# Patient Record
Sex: FEMALE | Race: BLACK OR AFRICAN AMERICAN | NOT HISPANIC OR LATINO | Employment: OTHER | URBAN - METROPOLITAN AREA
[De-identification: names, ages, dates, MRNs, and addresses within clinical notes are randomized per-mention and may not be internally consistent; named-entity substitution may affect disease eponyms.]

---

## 2017-03-01 ENCOUNTER — HOSPITAL ENCOUNTER (EMERGENCY)
Facility: HOSPITAL | Age: 82
Discharge: HOME | End: 2017-03-01
Attending: EMERGENCY MEDICINE
Payer: MEDICARE

## 2017-03-01 VITALS
WEIGHT: 106 LBS | TEMPERATURE: 97 F | HEART RATE: 63 BPM | BODY MASS INDEX: 19.51 KG/M2 | OXYGEN SATURATION: 99 % | SYSTOLIC BLOOD PRESSURE: 129 MMHG | RESPIRATION RATE: 16 BRPM | HEIGHT: 62 IN | DIASTOLIC BLOOD PRESSURE: 62 MMHG

## 2017-03-01 DIAGNOSIS — R60.9 DEPENDENT EDEMA: Primary | ICD-10-CM

## 2017-03-01 PROCEDURE — 99282 EMERGENCY DEPT VISIT SF MDM: CPT

## 2017-03-01 RX ORDER — GLIPIZIDE 5 MG/1
5 TABLET ORAL
COMMUNITY
End: 2023-05-24 | Stop reason: HOSPADM

## 2017-03-01 RX ORDER — METFORMIN HYDROCHLORIDE 500 MG/1
500 TABLET ORAL 2 TIMES DAILY WITH MEALS
COMMUNITY
End: 2023-05-24 | Stop reason: HOSPADM

## 2017-03-01 NOTE — ED AVS SNAPSHOT
[facility]  [address] [place] [address]               Tatiana Sparrow   3/1/2017  7:23 PM   ED    Description:  Female : 1920   Department:  [facility]           Your Care was Coordinated By:     Provider Role From To    Jessica Blandon MD Attending Provider 17 --      Reason for Visit     Foot Swelling           Diagnoses this Visit        Comments    Dependent edema    -  Primary       ED Disposition     ED Disposition Condition Comment    Discharge             To Do List           Follow-up Information     Follow up with Apple Beavers MD In 2 days.    Specialty:  Internal Medicine    Why:  For further care    Contact information:    [address]  [address]  [facility] [place] [address]  [phone]        [facility]     [facility] -  Assistance  Registered nurses in the [facility] provide clinical advisement, health education, appointment booking, and other advisory services.  Call for this free service at [phone].             Medications           Message regarding Medications     Verify the changes and/or additions to your medication regime listed below are the same as discussed with your clinician today.  If any of these changes or additions are incorrect, please notify your healthcare provider.             Verify that the below list of medications is an accurate representation of the medications you are currently taking.  If none reported, the list may be blank. If incorrect, please contact your healthcare provider. Carry this list with you in case of emergency.           Current Medications     glipiZIDE (GLUCOTROL) 5 MG tablet Take 5 mg by mouth 2 (two) times daily before meals.    LEVOTHYROXINE SODIUM (LEVOTHYROXINE ORAL) Take by mouth.    metformin (GLUCOPHAGE) 500 MG tablet Take 500 mg by mouth 2 (two) times daily with meals.    NIFEDIPINE (NIFEDICAL XL  ORAL) Take by mouth.           Clinical Reference Information           Your Vitals Were     BP                   130/66 (BP Location: Right arm, Patient Position: Sitting)           Allergies as of 3/1/2017     No Known Allergies      Immunizations Administered on Date of Encounter - 3/1/2017     None      ED Micro, Lab, POCT     None      ED Imaging Orders     None        Discharge Instructions         Leg Swelling in Both Legs    Swelling of the feet, ankles, and legs is called edema. It is caused by excess fluid that has collected in the tissues. Extra fluid in the body settles in the lowest part because of gravity. This is why the legs and feet are most affected.  Some of the causes for edema include:  · Disease of the heart like congestive heart failure  · Standing or sitting for long periods of time  · Infection of the feet or legs  · Blood pooling in the veins of your legs (venous insufficiency)  · Dilated veins in your lower leg (varicose veins)  · Garters or other clothing that is tight on your legs. This will cause blood to pool in your legs because the clothing limits blood flow.  · Some medicines such as hormones like birth control pills, some blood pressure medicines like calcium channel blockers (amlodipine) and steroids, some antidepressants like MAO inhibitors and tricyclics  · Menstrual periods that cause you to retain fluids  · Many types of renal disease  · Liver failure or cirrhosis  · Pregnancy, some swelling is normal, but a sudden increase in leg swelling or weight gain can be a sign of a dangerous complication of pregnancy  · Poor nutrition  · Thyroid disease  Medical treatment will depend on what is causing the swelling in your legs. Your healthcare provider may prescribe water pills (diuretics) to get rid of the extra fluid.  Home care  Follow these guidelines when caring for yourself at home:  · Don't wear clothing like garters that is tight on your legs.  · Keep your legs up while lying  or sitting.  · If infection, injury, or recent surgery is causing the swelling, stay off your legs as much as possible until symptoms get better.  · If your healthcare provider says that your leg swelling is caused by venous insufficiency or varicose veins, don't sit or  one place for long periods of time. Take breaks and walk about every few hours. Brisk walking is a good exercise. It helps circulate the blood that has collected in your leg. Talk with your provider about using support stockings to stop daytime leg swelling.  · If your provider says that heart disease is causing your leg swelling, follow a low-salt diet to stop extra fluid from staying in your body. You may also need medicine.  Follow-up care  Follow up with your healthcare provider, or as advised.  When to seek medical advice  Call your healthcare provider right away if any of these occur:  · New shortness of breath or chest pain  · Shortness of breath or chest pain that gets worse  · Swelling in both legs or ankles that gets worse  · Swelling of the abdomen  · Redness, warmth, or swelling in one leg  · Fever of 100.4ºF (38ºC) or higher, or as directed by your healthcare provider  · Yellow color to your skin or eyes  · Rapid, unexplained weight gain  · Having to sleep upright or use an increased number of pillows  Date Last Reviewed: 3/31/2016  © 3725-4545 [employer]. [address] [place] [address], [place] [address]. All rights reserved. This information is not intended as a substitute for professional medical care. Always follow your healthcare professional's instructions.          Lymphedema  The lymphatic system is made up of lymph vessels and lymph nodes, which carry a fluid called lymph. Lymph consists of waste from the cells. This fluid drains through lymph vessels under the skin to nearby lymph nodes. Lymph nodes filter waste products from the cells and kill any bacteria present before returning the lymph fluid to your blood  circulation.  When the lymph vessels are damaged, lymph fluid cannot drain from tissues. This causes the lymph fluid to back up leading to swelling. This most often affects the arms or legs. Signs of lymphedema include heaviness, stiffness, or aching in an arm or leg. The limb may swell. The skin might look red. Shoes and rings may feel tight. Ankles and wrists might become less flexible.  The most common cause of damage to the lymph system is surgery or radiation for breast or testicular cancer. Other causes include repeated skin infections (cellulitis), burns, or injury to the arms or legs. It can take many years for symptoms of lymphedema to appear. Once present, lymphedema can become a chronic condition. This means the problem can be managed but not cured.   Treatment often includes use of compression garments, massage, and special exercises. Talk to your healthcare provider about these therapies and best treatment plan for you.  Home care  You can help keep the condition from getting worse. Follow all instructions you have been given. Do your exercises and wear your compression garments as recommended. Also, care for yourself as instructed.   · Small skin injuries like a cut, burn, or insect bite are more likely to cause a skin infection. Take special care to avoid injury. If you have any signs of infection, call your healthcare provider right away.  · Take care of your skin and nails. Moisturize dry skin. Wear protective gloves when doing chores such as gardening.   · Don't wear tight clothing or jewelry on the affected arm or leg. Avoid carrying bags or other weight on the affected arm.  · Save with an electric razor instead of a razor blade.  · If at all possible, dont have blood pressure taken, get injections, or have blood drawn in the affected arm.  · If a leg is involved, dont cross your legs when sitting. Don't go barefoot.  · Avoid hot tubs, steam rooms, and saunas.  If you are at risk for lymphedema  but have not developed it, these tips can help also help prevent it. Follow your healthcare provider's instructions.  Follow-up care  Follow up with your healthcare provider, or as advised.  Lymphedema can change the appearance of your body. This can be emotionally difficult to adjust to. You may benefit from a support group where practical advice and emotional support is offered. Individual counseling is another option.  When to seek medical advice  Call your healthcare provider right away if any of these occur:  · Swelling worsens  · Rash, blistering, or other skin changes on the affected limb  · Area of skin becomes red, painful, or warm to the touch  · A wound increases in pain, becomes warm, drains pus, or radiates red streaks  · Fever of 100.4°F (38°C) or higher, or as directed by your healthcare provider  Date Last Reviewed: 10/1/2016  © 6717-9889 [employer]. [address] [place] [address]. All rights reserved. This information is not intended as a substitute for professional medical care. Always follow your healthcare professional's instructions.          [facility] Sign-Up     Activating your [facility] account is as easy as 1-2-3!     1) Visit [email or web address], select Sign Up Now, enter this activation code and your date of birth, then select Next.  [email or web address]  Expires: 4/15/2017  8:29 PM      2) Create a username and password to use when you visit [facility] in the future and select a security question in case you lose your password and select Next.    3) Enter your e-mail address and click Sign Up!    Additional Information  If you have questions, please e-mail [email or web address] or call [phone] to talk to our [facility] staff. Remember, [facility] is NOT to be used for urgent needs. For medical emergencies, dial 911.         Smoking Cessation     If you would like to quit smoking:   You may be eligible for free services if you are a [place] resident and started  smoking cigarettes before September 1, 1988.  Call the Smoking Cessation Trust (SCT) toll free at (279) 433-6757 or (251) 213-3129.   Call 1-800-QUIT-NOW if you do not meet the above criteria.             [facility] complies with applicable Federal civil rights laws and does not discriminate on the basis of race, color, national origin, age, disability, or sex.        Language Assistance Services     ATTENTION: Language assistance services are available, free of charge. Please call [phone].      ATENCIÓN: Si habla español, tiene a leger disposición servicios gratuitos de asistencia lingüística. Llame al [phone].     CHÚ Ý: N?u b?n nói Ti?ng Vi?t, có các d?ch v? h? tr? ngôn ng? mi?n phí dành cho b?n. G?i s? [phone].

## 2017-03-02 NOTE — ED PROVIDER NOTES
Encounter Date: 3/1/2017       History     Chief Complaint   Patient presents with    Foot Swelling     Review of patient's allergies indicates:  No Known Allergies  HPI Comments: Patient is having swelling to both lower extremities.  States this been going on for several days now.  Patient denies chest pain or shortness of breath    Patient is a 89 y.o. female presenting with the following complaint: general illness. The history is provided by the patient and a caregiver.   General Illness    The current episode started several days ago. The problem occurs continuously. The problem has been unchanged. The pain is at a severity of 2/10. Nothing relieves the symptoms. Nothing aggravates the symptoms. Pertinent negatives include no fever, no double vision, no constipation, no nausea, no vomiting, no congestion, no rhinorrhea, no muscle aches, no cough and no shortness of breath.     Past Medical History:   Diagnosis Date    Diabetes mellitus     Hypertension     Thyroid disease      Past Surgical History:   Procedure Laterality Date    CHOLECYSTECTOMY       No family history on file.  Social History   Substance Use Topics    Smoking status: None    Smokeless tobacco: None    Alcohol use None     Review of Systems   Constitutional: Negative for fever.   HENT: Negative for congestion and rhinorrhea.    Eyes: Negative for double vision.   Respiratory: Negative for cough and shortness of breath.    Cardiovascular: Positive for leg swelling.   Gastrointestinal: Negative for constipation, nausea and vomiting.   All other systems reviewed and are negative.      Physical Exam   Initial Vitals   BP Pulse Resp Temp SpO2   03/01/17 1845 03/01/17 1845 03/01/17 1845 03/01/17 1845 03/01/17 1845   130/66 60 16 97.1 °F (36.2 °C) 99 %     Physical Exam    Nursing note and vitals reviewed.  Constitutional: She appears well-developed and well-nourished.   HENT:   Head: Normocephalic and atraumatic.   Eyes: EOM are normal.    Neck: Normal range of motion. Neck supple.   Cardiovascular: Normal rate, regular rhythm, normal heart sounds and intact distal pulses.   Pulmonary/Chest: Breath sounds normal.   Abdominal: Soft.   Musculoskeletal: Normal range of motion.   Patient has mild swelling to the pretibial area up to mid calf.   Neurological: She is alert and oriented to person, place, and time.   Skin: Skin is warm and dry.   Psychiatric: She has a normal mood and affect. Her behavior is normal. Judgment and thought content normal.         ED Course   Procedures  Labs Reviewed - No data to display                            ED Course     Clinical Impression:   The encounter diagnosis was Dependent edema.    Disposition:   Disposition: Discharged  Condition: Stable       Jessica Blandon MD  03/02/17 0213

## 2017-03-02 NOTE — DISCHARGE INSTRUCTIONS
Leg Swelling in Both Legs    Swelling of the feet, ankles, and legs is called edema. It is caused by excess fluid that has collected in the tissues. Extra fluid in the body settles in the lowest part because of gravity. This is why the legs and feet are most affected.  Some of the causes for edema include:  · Disease of the heart like congestive heart failure  · Standing or sitting for long periods of time  · Infection of the feet or legs  · Blood pooling in the veins of your legs (venous insufficiency)  · Dilated veins in your lower leg (varicose veins)  · Garters or other clothing that is tight on your legs. This will cause blood to pool in your legs because the clothing limits blood flow.  · Some medicines such as hormones like birth control pills, some blood pressure medicines like calcium channel blockers (amlodipine) and steroids, some antidepressants like MAO inhibitors and tricyclics  · Menstrual periods that cause you to retain fluids  · Many types of renal disease  · Liver failure or cirrhosis  · Pregnancy, some swelling is normal, but a sudden increase in leg swelling or weight gain can be a sign of a dangerous complication of pregnancy  · Poor nutrition  · Thyroid disease  Medical treatment will depend on what is causing the swelling in your legs. Your healthcare provider may prescribe water pills (diuretics) to get rid of the extra fluid.  Home care  Follow these guidelines when caring for yourself at home:  · Don't wear clothing like garters that is tight on your legs.  · Keep your legs up while lying or sitting.  · If infection, injury, or recent surgery is causing the swelling, stay off your legs as much as possible until symptoms get better.  · If your healthcare provider says that your leg swelling is caused by venous insufficiency or varicose veins, don't sit or  one place for long periods of time. Take breaks and walk about every few hours. Brisk walking is a good exercise. It helps  circulate the blood that has collected in your leg. Talk with your provider about using support stockings to stop daytime leg swelling.  · If your provider says that heart disease is causing your leg swelling, follow a low-salt diet to stop extra fluid from staying in your body. You may also need medicine.  Follow-up care  Follow up with your healthcare provider, or as advised.  When to seek medical advice  Call your healthcare provider right away if any of these occur:  · New shortness of breath or chest pain  · Shortness of breath or chest pain that gets worse  · Swelling in both legs or ankles that gets worse  · Swelling of the abdomen  · Redness, warmth, or swelling in one leg  · Fever of 100.4ºF (38ºC) or higher, or as directed by your healthcare provider  · Yellow color to your skin or eyes  · Rapid, unexplained weight gain  · Having to sleep upright or use an increased number of pillows  Date Last Reviewed: 3/31/2016  © 6661-0077 [employer]. [address] [place] [address], [place] [address]. All rights reserved. This information is not intended as a substitute for professional medical care. Always follow your healthcare professional's instructions.          Lymphedema  The lymphatic system is made up of lymph vessels and lymph nodes, which carry a fluid called lymph. Lymph consists of waste from the cells. This fluid drains through lymph vessels under the skin to nearby lymph nodes. Lymph nodes filter waste products from the cells and kill any bacteria present before returning the lymph fluid to your blood circulation.  When the lymph vessels are damaged, lymph fluid cannot drain from tissues. This causes the lymph fluid to back up leading to swelling. This most often affects the arms or legs. Signs of lymphedema include heaviness, stiffness, or aching in an arm or leg. The limb may swell. The skin might look red. Shoes and rings may feel tight. Ankles and wrists might become less flexible.  The most  common cause of damage to the lymph system is surgery or radiation for breast or testicular cancer. Other causes include repeated skin infections (cellulitis), burns, or injury to the arms or legs. It can take many years for symptoms of lymphedema to appear. Once present, lymphedema can become a chronic condition. This means the problem can be managed but not cured.   Treatment often includes use of compression garments, massage, and special exercises. Talk to your healthcare provider about these therapies and best treatment plan for you.  Home care  You can help keep the condition from getting worse. Follow all instructions you have been given. Do your exercises and wear your compression garments as recommended. Also, care for yourself as instructed.   · Small skin injuries like a cut, burn, or insect bite are more likely to cause a skin infection. Take special care to avoid injury. If you have any signs of infection, call your healthcare provider right away.  · Take care of your skin and nails. Moisturize dry skin. Wear protective gloves when doing chores such as gardening.   · Don't wear tight clothing or jewelry on the affected arm or leg. Avoid carrying bags or other weight on the affected arm.  · Save with an electric razor instead of a razor blade.  · If at all possible, dont have blood pressure taken, get injections, or have blood drawn in the affected arm.  · If a leg is involved, dont cross your legs when sitting. Don't go barefoot.  · Avoid hot tubs, steam rooms, and saunas.  If you are at risk for lymphedema but have not developed it, these tips can help also help prevent it. Follow your healthcare provider's instructions.  Follow-up care  Follow up with your healthcare provider, or as advised.  Lymphedema can change the appearance of your body. This can be emotionally difficult to adjust to. You may benefit from a support group where practical advice and emotional support is offered. Individual  counseling is another option.  When to seek medical advice  Call your healthcare provider right away if any of these occur:  · Swelling worsens  · Rash, blistering, or other skin changes on the affected limb  · Area of skin becomes red, painful, or warm to the touch  · A wound increases in pain, becomes warm, drains pus, or radiates red streaks  · Fever of 100.4°F (38°C) or higher, or as directed by your healthcare provider  Date Last Reviewed: 10/1/2016  © 3721-6123 [employer]. [address] [place] [address]. All rights reserved. This information is not intended as a substitute for professional medical care. Always follow your healthcare professional's instructions.

## 2017-03-02 NOTE — ED NOTES
"Per the son " they family drank some water with I think sodium in it and some of us blow up so I think that's why her feet are swelling"  "

## 2023-01-01 ENCOUNTER — HOSPITAL ENCOUNTER (INPATIENT)
Facility: HOSPITAL | Age: 88
LOS: 1 days | DRG: 871 | End: 2023-05-23
Attending: EMERGENCY MEDICINE | Admitting: STUDENT IN AN ORGANIZED HEALTH CARE EDUCATION/TRAINING PROGRAM
Payer: MEDICARE

## 2023-01-01 VITALS
SYSTOLIC BLOOD PRESSURE: 150 MMHG | HEART RATE: 80 BPM | WEIGHT: 106 LBS | DIASTOLIC BLOOD PRESSURE: 77 MMHG | RESPIRATION RATE: 28 BRPM | HEIGHT: 62 IN | BODY MASS INDEX: 19.51 KG/M2 | TEMPERATURE: 98 F | OXYGEN SATURATION: 98 %

## 2023-01-01 DIAGNOSIS — R94.31 QT PROLONGATION: ICD-10-CM

## 2023-01-01 DIAGNOSIS — E87.5 HYPERKALEMIA: ICD-10-CM

## 2023-01-01 DIAGNOSIS — R07.9 CHEST PAIN: ICD-10-CM

## 2023-01-01 DIAGNOSIS — R41.82 AMS (ALTERED MENTAL STATUS): ICD-10-CM

## 2023-01-01 DIAGNOSIS — R00.1 BRADYCARDIA: ICD-10-CM

## 2023-01-01 DIAGNOSIS — R79.89 ELEVATED BRAIN NATRIURETIC PEPTIDE (BNP) LEVEL: ICD-10-CM

## 2023-01-01 LAB
ACANTHOCYTES BLD QL SMEAR: PRESENT
ACINETOBACTER CALCOACETICUS/BAUMANNII COMPLEX: NOT DETECTED
ALBUMIN SERPL BCP-MCNC: 2.4 G/DL (ref 3.5–5.2)
ALBUMIN SERPL BCP-MCNC: 2.4 G/DL (ref 3.5–5.2)
ALBUMIN SERPL BCP-MCNC: 3.2 G/DL (ref 3.5–5.2)
ALP SERPL-CCNC: 72 U/L (ref 55–135)
ALT SERPL W/O P-5'-P-CCNC: 36 U/L (ref 10–44)
ANION GAP SERPL CALC-SCNC: 10 MMOL/L (ref 8–16)
ANION GAP SERPL CALC-SCNC: 13 MMOL/L (ref 8–16)
ANION GAP SERPL CALC-SCNC: 13 MMOL/L (ref 8–16)
ANION GAP SERPL CALC-SCNC: 14 MMOL/L (ref 8–16)
ANION GAP SERPL CALC-SCNC: 21 MMOL/L (ref 8–16)
ANION GAP SERPL CALC-SCNC: 21 MMOL/L (ref 8–16)
ANISOCYTOSIS BLD QL SMEAR: SLIGHT
ANISOCYTOSIS BLD QL SMEAR: SLIGHT
APTT PPP: 24.4 SEC (ref 21–32)
ASCENDING AORTA: 2.94 CM
ASCENDING AORTA: 3.53 CM
AST SERPL-CCNC: 45 U/L (ref 10–40)
AV INDEX (PROSTH): 0.56
AV INDEX (PROSTH): 0.58
AV MEAN GRADIENT: 2 MMHG
AV MEAN GRADIENT: 9 MMHG
AV PEAK GRADIENT: 14 MMHG
AV PEAK GRADIENT: 4 MMHG
AV VALVE AREA: 1.54 CM2
AV VALVE AREA: 1.62 CM2
AV VELOCITY RATIO: 0.5
AV VELOCITY RATIO: 0.62
B-OH-BUTYR BLD STRIP-SCNC: 0.2 MMOL/L (ref 0–0.5)
BACTERIA #/AREA URNS AUTO: ABNORMAL /HPF
BACTERIA UR CULT: ABNORMAL
BACTERIA UR CULT: ABNORMAL
BACTEROIDES FRAGILIS: NOT DETECTED
BASOPHILS # BLD AUTO: 0.01 K/UL (ref 0–0.2)
BASOPHILS # BLD AUTO: 0.1 K/UL (ref 0–0.2)
BASOPHILS # BLD AUTO: ABNORMAL K/UL (ref 0–0.2)
BASOPHILS # BLD AUTO: ABNORMAL K/UL (ref 0–0.2)
BASOPHILS NFR BLD: 0 % (ref 0–1.9)
BASOPHILS NFR BLD: 0 % (ref 0–1.9)
BASOPHILS NFR BLD: 0.1 % (ref 0–1.9)
BASOPHILS NFR BLD: 0.5 % (ref 0–1.9)
BILIRUB SERPL-MCNC: 0.6 MG/DL (ref 0.1–1)
BILIRUB UR QL STRIP: NEGATIVE
BNP SERPL-MCNC: 845 PG/ML (ref 0–99)
BSA FOR ECHO PROCEDURE: 1.45 M2
BSA FOR ECHO PROCEDURE: 1.45 M2
BUN SERPL-MCNC: 46 MG/DL (ref 10–30)
BUN SERPL-MCNC: 48 MG/DL (ref 10–30)
BUN SERPL-MCNC: 60 MG/DL (ref 10–30)
BUN SERPL-MCNC: 64 MG/DL (ref 10–30)
BUN SERPL-MCNC: 80 MG/DL (ref 10–30)
BUN SERPL-MCNC: 80 MG/DL (ref 10–30)
BURR CELLS BLD QL SMEAR: ABNORMAL
CALCIUM SERPL-MCNC: 8.4 MG/DL (ref 8.7–10.5)
CALCIUM SERPL-MCNC: 8.6 MG/DL (ref 8.7–10.5)
CALCIUM SERPL-MCNC: 8.9 MG/DL (ref 8.7–10.5)
CALCIUM SERPL-MCNC: 9.1 MG/DL (ref 8.7–10.5)
CALCIUM SERPL-MCNC: 9.2 MG/DL (ref 8.7–10.5)
CALCIUM SERPL-MCNC: 9.4 MG/DL (ref 8.7–10.5)
CANDIDA ALBICANS: NOT DETECTED
CANDIDA AURIS: NOT DETECTED
CANDIDA GLABRATA: NOT DETECTED
CANDIDA KRUSEI: NOT DETECTED
CANDIDA PARAPSILOSIS: NOT DETECTED
CANDIDA TROPICALIS: NOT DETECTED
CHLORIDE SERPL-SCNC: 105 MMOL/L (ref 95–110)
CHLORIDE SERPL-SCNC: 106 MMOL/L (ref 95–110)
CHLORIDE SERPL-SCNC: 107 MMOL/L (ref 95–110)
CHLORIDE SERPL-SCNC: 107 MMOL/L (ref 95–110)
CLARITY UR REFRACT.AUTO: ABNORMAL
CO2 SERPL-SCNC: 12 MMOL/L (ref 23–29)
CO2 SERPL-SCNC: 13 MMOL/L (ref 23–29)
CO2 SERPL-SCNC: 19 MMOL/L (ref 23–29)
CO2 SERPL-SCNC: 20 MMOL/L (ref 23–29)
CO2 SERPL-SCNC: 20 MMOL/L (ref 23–29)
CO2 SERPL-SCNC: 21 MMOL/L (ref 23–29)
COLOR UR AUTO: ABNORMAL
CREAT SERPL-MCNC: 0.9 MG/DL (ref 0.5–1.4)
CREAT SERPL-MCNC: 1 MG/DL (ref 0.5–1.4)
CREAT SERPL-MCNC: 1.5 MG/DL (ref 0.5–1.4)
CREAT SERPL-MCNC: 1.8 MG/DL (ref 0.5–1.4)
CREAT SERPL-MCNC: 2.2 MG/DL (ref 0.5–1.4)
CREAT SERPL-MCNC: 2.2 MG/DL (ref 0.5–1.4)
CRYPTOCOCCUS NEOFORMANS/GATTII: NOT DETECTED
CTX-M GENE: NOT DETECTED
CV ECHO LV RWT: 0.41 CM
CV ECHO LV RWT: 0.59 CM
DIFFERENTIAL METHOD: ABNORMAL
DOHLE BOD BLD QL SMEAR: PRESENT
DOHLE BOD BLD QL SMEAR: PRESENT
DOP CALC AO PEAK VEL: 1.03 M/S
DOP CALC AO PEAK VEL: 1.85 M/S
DOP CALC AO VTI: 14.35 CM
DOP CALC AO VTI: 28 CM
DOP CALC LVOT AREA: 2.8 CM2
DOP CALC LVOT AREA: 2.8 CM2
DOP CALC LVOT DIAMETER: 1.88 CM
DOP CALC LVOT DIAMETER: 1.88 CM
DOP CALC LVOT PEAK VEL: 0.52 M/S
DOP CALC LVOT PEAK VEL: 1.15 M/S
DOP CALC LVOT STROKE VOLUME: 22.14 CM3
DOP CALC LVOT STROKE VOLUME: 45.25 CM3
DOP CALC MV VTI: 28.28 CM
DOP CALCLVOT PEAK VEL VTI: 16.31 CM
DOP CALCLVOT PEAK VEL VTI: 7.98 CM
E WAVE DECELERATION TIME: 213.64 MSEC
E/E' RATIO: 29.6 M/S
ECHO LV POSTERIOR WALL: 0.92 CM (ref 0.6–1.1)
ECHO LV POSTERIOR WALL: 1.06 CM (ref 0.6–1.1)
EJECTION FRACTION: 15 %
EJECTION FRACTION: 45 %
ENTEROBACTER CLOACAE COMPLEX: NOT DETECTED
ENTEROBACTERALES: ABNORMAL
ENTEROCOCCUS FAECALIS: NOT DETECTED
ENTEROCOCCUS FAECIUM: NOT DETECTED
EOSINOPHIL # BLD AUTO: 0 K/UL (ref 0–0.5)
EOSINOPHIL # BLD AUTO: 0 K/UL (ref 0–0.5)
EOSINOPHIL # BLD AUTO: ABNORMAL K/UL (ref 0–0.5)
EOSINOPHIL # BLD AUTO: ABNORMAL K/UL (ref 0–0.5)
EOSINOPHIL NFR BLD: 0 % (ref 0–8)
EOSINOPHIL NFR BLD: 1 % (ref 0–8)
ERYTHROCYTE [DISTWIDTH] IN BLOOD BY AUTOMATED COUNT: 14.2 % (ref 11.5–14.5)
ERYTHROCYTE [DISTWIDTH] IN BLOOD BY AUTOMATED COUNT: 14.6 % (ref 11.5–14.5)
ESCHERICHIA COLI: DETECTED
EST. GFR  (NO RACE VARIABLE): 19.3 ML/MIN/1.73 M^2
EST. GFR  (NO RACE VARIABLE): 19.3 ML/MIN/1.73 M^2
EST. GFR  (NO RACE VARIABLE): 24.5 ML/MIN/1.73 M^2
EST. GFR  (NO RACE VARIABLE): 30.5 ML/MIN/1.73 M^2
EST. GFR  (NO RACE VARIABLE): 49.7 ML/MIN/1.73 M^2
EST. GFR  (NO RACE VARIABLE): 56.4 ML/MIN/1.73 M^2
ESTIMATED AVG GLUCOSE: 143 MG/DL (ref 68–131)
FERRITIN SERPL-MCNC: 427 NG/ML (ref 20–300)
FOLATE SERPL-MCNC: 15.8 NG/ML (ref 4–24)
FRACTIONAL SHORTENING: 30 % (ref 28–44)
FRACTIONAL SHORTENING: 6 % (ref 28–44)
GLUCOSE SERPL-MCNC: 119 MG/DL (ref 70–110)
GLUCOSE SERPL-MCNC: 132 MG/DL (ref 70–110)
GLUCOSE SERPL-MCNC: 137 MG/DL (ref 70–110)
GLUCOSE SERPL-MCNC: 162 MG/DL (ref 70–110)
GLUCOSE SERPL-MCNC: 236 MG/DL (ref 70–110)
GLUCOSE SERPL-MCNC: 277 MG/DL (ref 70–110)
GLUCOSE UR QL STRIP: NEGATIVE
HAEMOPHILUS INFLUENZAE: NOT DETECTED
HBA1C MFR BLD: 6.6 % (ref 4–5.6)
HCT VFR BLD AUTO: 30.5 % (ref 37–48.5)
HCT VFR BLD AUTO: 32 % (ref 37–48.5)
HCT VFR BLD AUTO: 34.9 % (ref 37–48.5)
HCT VFR BLD AUTO: 38.4 % (ref 37–48.5)
HGB BLD-MCNC: 11 G/DL (ref 12–16)
HGB BLD-MCNC: 11.8 G/DL (ref 12–16)
HGB BLD-MCNC: 9.7 G/DL (ref 12–16)
HGB BLD-MCNC: 9.9 G/DL (ref 12–16)
HGB UR QL STRIP: ABNORMAL
HYALINE CASTS UR QL AUTO: 0 /LPF
HYPOCHROMIA BLD QL SMEAR: ABNORMAL
HYPOCHROMIA BLD QL SMEAR: ABNORMAL
IMM GRANULOCYTES # BLD AUTO: 0.08 K/UL (ref 0–0.04)
IMM GRANULOCYTES # BLD AUTO: 0.14 K/UL (ref 0–0.04)
IMM GRANULOCYTES # BLD AUTO: ABNORMAL K/UL (ref 0–0.04)
IMM GRANULOCYTES # BLD AUTO: ABNORMAL K/UL (ref 0–0.04)
IMM GRANULOCYTES NFR BLD AUTO: 0.5 % (ref 0–0.5)
IMM GRANULOCYTES NFR BLD AUTO: 0.7 % (ref 0–0.5)
IMM GRANULOCYTES NFR BLD AUTO: ABNORMAL % (ref 0–0.5)
IMM GRANULOCYTES NFR BLD AUTO: ABNORMAL % (ref 0–0.5)
IMP GENE: NOT DETECTED
INR PPP: 1.3 (ref 0.8–1.2)
INTERVENTRICULAR SEPTUM: 0.94 CM (ref 0.6–1.1)
INTERVENTRICULAR SEPTUM: 0.96 CM (ref 0.6–1.1)
IRON SERPL-MCNC: 12 UG/DL (ref 30–160)
IVRT: 77.07 MSEC
KETONES UR QL STRIP: NEGATIVE
KLEBSIELLA AEROGENES: NOT DETECTED
KLEBSIELLA OXYTOCA: NOT DETECTED
KLEBSIELLA PNEUMONIAE GROUP: DETECTED
KPC: NOT DETECTED
LA MAJOR: 4.74 CM
LA MAJOR: 5.91 CM
LA MINOR: 4.81 CM
LA MINOR: 5.71 CM
LA WIDTH: 4.25 CM
LA WIDTH: 4.53 CM
LACTATE SERPL-SCNC: 1.9 MMOL/L (ref 0.5–2.2)
LEFT ATRIUM SIZE: 3.28 CM
LEFT ATRIUM SIZE: 3.99 CM
LEFT ATRIUM VOLUME INDEX MOD: 45.5 ML/M2
LEFT ATRIUM VOLUME INDEX: 47.1 ML/M2
LEFT ATRIUM VOLUME INDEX: 50.2 ML/M2
LEFT ATRIUM VOLUME MOD: 66.36 CM3
LEFT ATRIUM VOLUME: 68.82 CM3
LEFT ATRIUM VOLUME: 73.36 CM3
LEFT INTERNAL DIMENSION IN SYSTOLE: 2.52 CM (ref 2.1–4)
LEFT INTERNAL DIMENSION IN SYSTOLE: 4.23 CM (ref 2.1–4)
LEFT VENTRICLE DIASTOLIC VOLUME INDEX: 37.1 ML/M2
LEFT VENTRICLE DIASTOLIC VOLUME INDEX: 63.73 ML/M2
LEFT VENTRICLE DIASTOLIC VOLUME: 54.16 ML
LEFT VENTRICLE DIASTOLIC VOLUME: 93.04 ML
LEFT VENTRICLE MASS INDEX: 74 G/M2
LEFT VENTRICLE MASS INDEX: 97 G/M2
LEFT VENTRICLE SYSTOLIC VOLUME INDEX: 15.5 ML/M2
LEFT VENTRICLE SYSTOLIC VOLUME INDEX: 54.8 ML/M2
LEFT VENTRICLE SYSTOLIC VOLUME: 22.69 ML
LEFT VENTRICLE SYSTOLIC VOLUME: 80 ML
LEFT VENTRICULAR INTERNAL DIMENSION IN DIASTOLE: 3.59 CM (ref 3.5–6)
LEFT VENTRICULAR INTERNAL DIMENSION IN DIASTOLE: 4.51 CM (ref 3.5–6)
LEFT VENTRICULAR MASS: 107.44 G
LEFT VENTRICULAR MASS: 141.36 G
LEUKOCYTE ESTERASE UR QL STRIP: ABNORMAL
LISTERIA MONOCYTOGENES: NOT DETECTED
LV LATERAL E/E' RATIO: 24.67 M/S
LV SEPTAL E/E' RATIO: 37 M/S
LYMPHOCYTES # BLD AUTO: 0.5 K/UL (ref 1–4.8)
LYMPHOCYTES # BLD AUTO: 0.6 K/UL (ref 1–4.8)
LYMPHOCYTES # BLD AUTO: ABNORMAL K/UL (ref 1–4.8)
LYMPHOCYTES # BLD AUTO: ABNORMAL K/UL (ref 1–4.8)
LYMPHOCYTES NFR BLD: 2.7 % (ref 18–48)
LYMPHOCYTES NFR BLD: 4 % (ref 18–48)
LYMPHOCYTES NFR BLD: 4.2 % (ref 18–48)
LYMPHOCYTES NFR BLD: 5 % (ref 18–48)
MAGNESIUM SERPL-MCNC: 2 MG/DL (ref 1.6–2.6)
MAGNESIUM SERPL-MCNC: 2.1 MG/DL (ref 1.6–2.6)
MAGNESIUM SERPL-MCNC: 2.2 MG/DL (ref 1.6–2.6)
MAGNESIUM SERPL-MCNC: 2.3 MG/DL (ref 1.6–2.6)
MCH RBC QN AUTO: 33.1 PG (ref 27–31)
MCH RBC QN AUTO: 33.4 PG (ref 27–31)
MCH RBC QN AUTO: 33.5 PG (ref 27–31)
MCH RBC QN AUTO: 33.7 PG (ref 27–31)
MCHC RBC AUTO-ENTMCNC: 30.7 G/DL (ref 32–36)
MCHC RBC AUTO-ENTMCNC: 30.9 G/DL (ref 32–36)
MCHC RBC AUTO-ENTMCNC: 31.5 G/DL (ref 32–36)
MCHC RBC AUTO-ENTMCNC: 31.8 G/DL (ref 32–36)
MCR-1: NOT DETECTED
MCV RBC AUTO: 105 FL (ref 82–98)
MCV RBC AUTO: 106 FL (ref 82–98)
MCV RBC AUTO: 107 FL (ref 82–98)
MCV RBC AUTO: 110 FL (ref 82–98)
MEC A/C AND MREJ (MRSA): ABNORMAL
MEC A/C: ABNORMAL
METAMYELOCYTES NFR BLD MANUAL: 2 %
METAMYELOCYTES NFR BLD MANUAL: 3 %
MICROSCOPIC COMMENT: ABNORMAL
MONOCYTES # BLD AUTO: 0.6 K/UL (ref 0.3–1)
MONOCYTES # BLD AUTO: 0.8 K/UL (ref 0.3–1)
MONOCYTES # BLD AUTO: ABNORMAL K/UL (ref 0.3–1)
MONOCYTES # BLD AUTO: ABNORMAL K/UL (ref 0.3–1)
MONOCYTES NFR BLD: 3 % (ref 4–15)
MONOCYTES NFR BLD: 4 % (ref 4–15)
MONOCYTES NFR BLD: 4 % (ref 4–15)
MONOCYTES NFR BLD: 5.7 % (ref 4–15)
MV MEAN GRADIENT: 5 MMHG
MV PEAK E VEL: 1.48 M/S
MV PEAK GRADIENT: 14 MMHG
MV STENOSIS PRESSURE HALF TIME: 61.96 MS
MV VALVE AREA BY CONTINUITY EQUATION: 1.6 CM2
MV VALVE AREA P 1/2 METHOD: 3.55 CM2
MYELOCYTES NFR BLD MANUAL: 1 %
NDM GENE: NOT DETECTED
NEISSERIA MENINGITIDIS: NOT DETECTED
NEUTROPHILS # BLD AUTO: 13.3 K/UL (ref 1.8–7.7)
NEUTROPHILS # BLD AUTO: 18.1 K/UL (ref 1.8–7.7)
NEUTROPHILS NFR BLD: 74 % (ref 38–73)
NEUTROPHILS NFR BLD: 75 % (ref 38–73)
NEUTROPHILS NFR BLD: 89.5 % (ref 38–73)
NEUTROPHILS NFR BLD: 93.1 % (ref 38–73)
NEUTS BAND NFR BLD MANUAL: 13 %
NEUTS BAND NFR BLD MANUAL: 14 %
NITRITE UR QL STRIP: NEGATIVE
NRBC BLD-RTO: 0 /100 WBC
OVALOCYTES BLD QL SMEAR: ABNORMAL
OVALOCYTES BLD QL SMEAR: ABNORMAL
OXA-48-LIKE: NOT DETECTED
PH UR STRIP: 7 [PH] (ref 5–8)
PHOSPHATE SERPL-MCNC: 3.1 MG/DL (ref 2.7–4.5)
PHOSPHATE SERPL-MCNC: 3.5 MG/DL (ref 2.7–4.5)
PHOSPHATE SERPL-MCNC: 5 MG/DL (ref 2.7–4.5)
PHOSPHATE SERPL-MCNC: 6.7 MG/DL (ref 2.7–4.5)
PHOSPHATE SERPL-MCNC: 7.5 MG/DL (ref 2.7–4.5)
PISA TR MAX VEL: 3.51 M/S
PISA TR MAX VEL: 3.69 M/S
PLATELET # BLD AUTO: 118 K/UL (ref 150–450)
PLATELET # BLD AUTO: 129 K/UL (ref 150–450)
PLATELET # BLD AUTO: 141 K/UL (ref 150–450)
PLATELET # BLD AUTO: 189 K/UL (ref 150–450)
PLATELET BLD QL SMEAR: ABNORMAL
PMV BLD AUTO: 11.2 FL (ref 9.2–12.9)
PMV BLD AUTO: 11.4 FL (ref 9.2–12.9)
PMV BLD AUTO: 11.7 FL (ref 9.2–12.9)
PMV BLD AUTO: 11.8 FL (ref 9.2–12.9)
POCT GLUCOSE: 106 MG/DL (ref 70–110)
POCT GLUCOSE: 121 MG/DL (ref 70–110)
POCT GLUCOSE: 135 MG/DL (ref 70–110)
POCT GLUCOSE: 147 MG/DL (ref 70–110)
POCT GLUCOSE: 160 MG/DL (ref 70–110)
POCT GLUCOSE: 179 MG/DL (ref 70–110)
POCT GLUCOSE: 209 MG/DL (ref 70–110)
POCT GLUCOSE: 233 MG/DL (ref 70–110)
POCT GLUCOSE: 237 MG/DL (ref 70–110)
POCT GLUCOSE: 238 MG/DL (ref 70–110)
POCT GLUCOSE: 256 MG/DL (ref 70–110)
POCT GLUCOSE: 269 MG/DL (ref 70–110)
POCT GLUCOSE: 289 MG/DL (ref 70–110)
POIKILOCYTOSIS BLD QL SMEAR: ABNORMAL
POIKILOCYTOSIS BLD QL SMEAR: ABNORMAL
POLYCHROMASIA BLD QL SMEAR: ABNORMAL
POLYCHROMASIA BLD QL SMEAR: ABNORMAL
POTASSIUM SERPL-SCNC: 4.7 MMOL/L (ref 3.5–5.1)
POTASSIUM SERPL-SCNC: 4.7 MMOL/L (ref 3.5–5.1)
POTASSIUM SERPL-SCNC: 5.2 MMOL/L (ref 3.5–5.1)
POTASSIUM SERPL-SCNC: 5.3 MMOL/L (ref 3.5–5.1)
POTASSIUM SERPL-SCNC: 5.8 MMOL/L (ref 3.5–5.1)
POTASSIUM SERPL-SCNC: 6.5 MMOL/L (ref 3.5–5.1)
PROT SERPL-MCNC: 7.1 G/DL (ref 6–8.4)
PROT UR QL STRIP: ABNORMAL
PROTEUS SPECIES: NOT DETECTED
PROTHROMBIN TIME: 13.8 SEC (ref 9–12.5)
PSEUDOMONAS AERUGINOSA: NOT DETECTED
RA MAJOR: 4.58 CM
RA MAJOR: 4.91 CM
RA PRESSURE: 15 MMHG
RA WIDTH: 3.26 CM
RA WIDTH: 3.41 CM
RBC # BLD AUTO: 2.9 M/UL (ref 4–5.4)
RBC # BLD AUTO: 2.99 M/UL (ref 4–5.4)
RBC # BLD AUTO: 3.28 M/UL (ref 4–5.4)
RBC # BLD AUTO: 3.5 M/UL (ref 4–5.4)
RBC #/AREA URNS AUTO: 42 /HPF (ref 0–4)
RIGHT VENTRICULAR END-DIASTOLIC DIMENSION: 2.56 CM
RIGHT VENTRICULAR END-DIASTOLIC DIMENSION: 3.08 CM
SALMONELLA SP: NOT DETECTED
SATURATED IRON: 4 % (ref 20–50)
SCHISTOCYTES BLD QL SMEAR: ABNORMAL
SCHISTOCYTES BLD QL SMEAR: PRESENT
SERRATIA MARCESCENS: NOT DETECTED
SINUS: 3.11 CM
SINUS: 3.57 CM
SODIUM SERPL-SCNC: 136 MMOL/L (ref 136–145)
SODIUM SERPL-SCNC: 138 MMOL/L (ref 136–145)
SODIUM SERPL-SCNC: 138 MMOL/L (ref 136–145)
SODIUM SERPL-SCNC: 139 MMOL/L (ref 136–145)
SODIUM SERPL-SCNC: 140 MMOL/L (ref 136–145)
SODIUM SERPL-SCNC: 141 MMOL/L (ref 136–145)
SP GR UR STRIP: 1.01 (ref 1–1.03)
SQUAMOUS #/AREA URNS AUTO: 2 /HPF
STAPHYLOCOCCUS AUREUS: NOT DETECTED
STAPHYLOCOCCUS EPIDERMIDIS: NOT DETECTED
STAPHYLOCOCCUS LUGDUNESIS: NOT DETECTED
STAPHYLOCOCCUS SPECIES: NOT DETECTED
STENOTROPHOMONAS MALTOPHILIA: NOT DETECTED
STJ: 2.93 CM
STJ: 3.04 CM
STREPTOCOCCUS AGALACTIAE: NOT DETECTED
STREPTOCOCCUS PNEUMONIAE: NOT DETECTED
STREPTOCOCCUS PYOGENES: NOT DETECTED
STREPTOCOCCUS SPECIES: NOT DETECTED
TDI LATERAL: 0.06 M/S
TDI SEPTAL: 0.04 M/S
TDI: 0.05 M/S
TOTAL IRON BINDING CAPACITY: 278 UG/DL (ref 250–450)
TOXIC GRANULES BLD QL SMEAR: PRESENT
TOXIC GRANULES BLD QL SMEAR: PRESENT
TR MAX PG: 49 MMHG
TR MAX PG: 54 MMHG
TRANSFERRIN SERPL-MCNC: 188 MG/DL (ref 200–375)
TRICUSPID ANNULAR PLANE SYSTOLIC EXCURSION: 1.38 CM
TRICUSPID ANNULAR PLANE SYSTOLIC EXCURSION: 1.38 CM
TROPONIN I SERPL DL<=0.01 NG/ML-MCNC: 0.07 NG/ML (ref 0–0.03)
TROPONIN I SERPL DL<=0.01 NG/ML-MCNC: 0.09 NG/ML (ref 0–0.03)
TROPONIN I SERPL DL<=0.01 NG/ML-MCNC: 3.73 NG/ML (ref 0–0.03)
TSH SERPL DL<=0.005 MIU/L-ACNC: 3.73 UIU/ML (ref 0.4–4)
TV REST PULMONARY ARTERY PRESSURE: 69 MMHG
URN SPEC COLLECT METH UR: ABNORMAL
VAN A/B: ABNORMAL
VIM GENE: NOT DETECTED
VIT B12 SERPL-MCNC: 1399 PG/ML (ref 210–950)
WBC # BLD AUTO: 14.84 K/UL (ref 3.9–12.7)
WBC # BLD AUTO: 19.42 K/UL (ref 3.9–12.7)
WBC # BLD AUTO: 22.92 K/UL (ref 3.9–12.7)
WBC # BLD AUTO: 28.94 K/UL (ref 3.9–12.7)
WBC #/AREA URNS AUTO: >100 /HPF (ref 0–5)
WBC CLUMPS UR QL AUTO: ABNORMAL

## 2023-01-01 PROCEDURE — 87077 CULTURE AEROBIC IDENTIFY: CPT | Performed by: STUDENT IN AN ORGANIZED HEALTH CARE EDUCATION/TRAINING PROGRAM

## 2023-01-01 PROCEDURE — 85610 PROTHROMBIN TIME: CPT | Performed by: STUDENT IN AN ORGANIZED HEALTH CARE EDUCATION/TRAINING PROGRAM

## 2023-01-01 PROCEDURE — 93010 ELECTROCARDIOGRAM REPORT: CPT | Mod: ,,, | Performed by: INTERNAL MEDICINE

## 2023-01-01 PROCEDURE — 99285 EMERGENCY DEPT VISIT HI MDM: CPT | Mod: 25

## 2023-01-01 PROCEDURE — 92526 ORAL FUNCTION THERAPY: CPT

## 2023-01-01 PROCEDURE — 80069 RENAL FUNCTION PANEL: CPT | Mod: 91 | Performed by: STUDENT IN AN ORGANIZED HEALTH CARE EDUCATION/TRAINING PROGRAM

## 2023-01-01 PROCEDURE — 25000003 PHARM REV CODE 250: Performed by: STUDENT IN AN ORGANIZED HEALTH CARE EDUCATION/TRAINING PROGRAM

## 2023-01-01 PROCEDURE — 84100 ASSAY OF PHOSPHORUS: CPT | Performed by: STUDENT IN AN ORGANIZED HEALTH CARE EDUCATION/TRAINING PROGRAM

## 2023-01-01 PROCEDURE — 83735 ASSAY OF MAGNESIUM: CPT | Performed by: STUDENT IN AN ORGANIZED HEALTH CARE EDUCATION/TRAINING PROGRAM

## 2023-01-01 PROCEDURE — 25000003 PHARM REV CODE 250

## 2023-01-01 PROCEDURE — 21400001 HC TELEMETRY ROOM

## 2023-01-01 PROCEDURE — 85007 BL SMEAR W/DIFF WBC COUNT: CPT | Performed by: STUDENT IN AN ORGANIZED HEALTH CARE EDUCATION/TRAINING PROGRAM

## 2023-01-01 PROCEDURE — 85027 COMPLETE CBC AUTOMATED: CPT | Performed by: STUDENT IN AN ORGANIZED HEALTH CARE EDUCATION/TRAINING PROGRAM

## 2023-01-01 PROCEDURE — G0378 HOSPITAL OBSERVATION PER HR: HCPCS

## 2023-01-01 PROCEDURE — 36415 COLL VENOUS BLD VENIPUNCTURE: CPT | Performed by: STUDENT IN AN ORGANIZED HEALTH CARE EDUCATION/TRAINING PROGRAM

## 2023-01-01 PROCEDURE — 93005 ELECTROCARDIOGRAM TRACING: CPT

## 2023-01-01 PROCEDURE — 63600175 PHARM REV CODE 636 W HCPCS

## 2023-01-01 PROCEDURE — 96361 HYDRATE IV INFUSION ADD-ON: CPT

## 2023-01-01 PROCEDURE — 96367 TX/PROPH/DG ADDL SEQ IV INF: CPT

## 2023-01-01 PROCEDURE — 84484 ASSAY OF TROPONIN QUANT: CPT | Performed by: EMERGENCY MEDICINE

## 2023-01-01 PROCEDURE — 99223 PR INITIAL HOSPITAL CARE,LEVL III: ICD-10-PCS | Mod: AI,,, | Performed by: STUDENT IN AN ORGANIZED HEALTH CARE EDUCATION/TRAINING PROGRAM

## 2023-01-01 PROCEDURE — 63600175 PHARM REV CODE 636 W HCPCS: Performed by: STUDENT IN AN ORGANIZED HEALTH CARE EDUCATION/TRAINING PROGRAM

## 2023-01-01 PROCEDURE — 92610 EVALUATE SWALLOWING FUNCTION: CPT

## 2023-01-01 PROCEDURE — 87088 URINE BACTERIA CULTURE: CPT | Performed by: STUDENT IN AN ORGANIZED HEALTH CARE EDUCATION/TRAINING PROGRAM

## 2023-01-01 PROCEDURE — 87077 CULTURE AEROBIC IDENTIFY: CPT | Mod: 59

## 2023-01-01 PROCEDURE — 80053 COMPREHEN METABOLIC PANEL: CPT | Performed by: EMERGENCY MEDICINE

## 2023-01-01 PROCEDURE — 82962 GLUCOSE BLOOD TEST: CPT

## 2023-01-01 PROCEDURE — 87040 BLOOD CULTURE FOR BACTERIA: CPT | Mod: 59 | Performed by: STUDENT IN AN ORGANIZED HEALTH CARE EDUCATION/TRAINING PROGRAM

## 2023-01-01 PROCEDURE — 99233 PR SUBSEQUENT HOSPITAL CARE,LEVL III: ICD-10-PCS | Mod: GC,,, | Performed by: STUDENT IN AN ORGANIZED HEALTH CARE EDUCATION/TRAINING PROGRAM

## 2023-01-01 PROCEDURE — 99900035 HC TECH TIME PER 15 MIN (STAT)

## 2023-01-01 PROCEDURE — 82607 VITAMIN B-12: CPT | Performed by: STUDENT IN AN ORGANIZED HEALTH CARE EDUCATION/TRAINING PROGRAM

## 2023-01-01 PROCEDURE — 84443 ASSAY THYROID STIM HORMONE: CPT | Performed by: EMERGENCY MEDICINE

## 2023-01-01 PROCEDURE — 99285 PR EMERGENCY DEPT VISIT,LEVEL V: ICD-10-PCS | Mod: ,,, | Performed by: EMERGENCY MEDICINE

## 2023-01-01 PROCEDURE — 99223 1ST HOSP IP/OBS HIGH 75: CPT | Mod: AI,,, | Performed by: STUDENT IN AN ORGANIZED HEALTH CARE EDUCATION/TRAINING PROGRAM

## 2023-01-01 PROCEDURE — 94761 N-INVAS EAR/PLS OXIMETRY MLT: CPT

## 2023-01-01 PROCEDURE — 93010 ELECTROCARDIOGRAM REPORT: CPT | Mod: 76,,, | Performed by: INTERNAL MEDICINE

## 2023-01-01 PROCEDURE — 93010 EKG 12-LEAD: ICD-10-PCS | Mod: 76,,, | Performed by: INTERNAL MEDICINE

## 2023-01-01 PROCEDURE — 85730 THROMBOPLASTIN TIME PARTIAL: CPT | Performed by: STUDENT IN AN ORGANIZED HEALTH CARE EDUCATION/TRAINING PROGRAM

## 2023-01-01 PROCEDURE — 80048 BASIC METABOLIC PNL TOTAL CA: CPT | Performed by: STUDENT IN AN ORGANIZED HEALTH CARE EDUCATION/TRAINING PROGRAM

## 2023-01-01 PROCEDURE — 83880 ASSAY OF NATRIURETIC PEPTIDE: CPT | Performed by: EMERGENCY MEDICINE

## 2023-01-01 PROCEDURE — 85025 COMPLETE CBC W/AUTO DIFF WBC: CPT | Performed by: EMERGENCY MEDICINE

## 2023-01-01 PROCEDURE — 96376 TX/PRO/DX INJ SAME DRUG ADON: CPT

## 2023-01-01 PROCEDURE — 81001 URINALYSIS AUTO W/SCOPE: CPT | Performed by: STUDENT IN AN ORGANIZED HEALTH CARE EDUCATION/TRAINING PROGRAM

## 2023-01-01 PROCEDURE — 87040 BLOOD CULTURE FOR BACTERIA: CPT

## 2023-01-01 PROCEDURE — 87154 CUL TYP ID BLD PTHGN 6+ TRGT: CPT

## 2023-01-01 PROCEDURE — 80069 RENAL FUNCTION PANEL: CPT

## 2023-01-01 PROCEDURE — 83036 HEMOGLOBIN GLYCOSYLATED A1C: CPT | Performed by: STUDENT IN AN ORGANIZED HEALTH CARE EDUCATION/TRAINING PROGRAM

## 2023-01-01 PROCEDURE — 82728 ASSAY OF FERRITIN: CPT | Performed by: STUDENT IN AN ORGANIZED HEALTH CARE EDUCATION/TRAINING PROGRAM

## 2023-01-01 PROCEDURE — 87186 SC STD MICRODIL/AGAR DIL: CPT | Performed by: STUDENT IN AN ORGANIZED HEALTH CARE EDUCATION/TRAINING PROGRAM

## 2023-01-01 PROCEDURE — 36415 COLL VENOUS BLD VENIPUNCTURE: CPT

## 2023-01-01 PROCEDURE — 85025 COMPLETE CBC W/AUTO DIFF WBC: CPT | Performed by: STUDENT IN AN ORGANIZED HEALTH CARE EDUCATION/TRAINING PROGRAM

## 2023-01-01 PROCEDURE — 99233 SBSQ HOSP IP/OBS HIGH 50: CPT | Mod: GC,,, | Performed by: STUDENT IN AN ORGANIZED HEALTH CARE EDUCATION/TRAINING PROGRAM

## 2023-01-01 PROCEDURE — 96365 THER/PROPH/DIAG IV INF INIT: CPT

## 2023-01-01 PROCEDURE — 82010 KETONE BODYS QUAN: CPT | Performed by: EMERGENCY MEDICINE

## 2023-01-01 PROCEDURE — 87186 SC STD MICRODIL/AGAR DIL: CPT | Mod: 59

## 2023-01-01 PROCEDURE — 84484 ASSAY OF TROPONIN QUANT: CPT

## 2023-01-01 PROCEDURE — 83605 ASSAY OF LACTIC ACID: CPT | Performed by: STUDENT IN AN ORGANIZED HEALTH CARE EDUCATION/TRAINING PROGRAM

## 2023-01-01 PROCEDURE — 84425 ASSAY OF VITAMIN B-1: CPT

## 2023-01-01 PROCEDURE — 84466 ASSAY OF TRANSFERRIN: CPT | Performed by: STUDENT IN AN ORGANIZED HEALTH CARE EDUCATION/TRAINING PROGRAM

## 2023-01-01 PROCEDURE — 87086 URINE CULTURE/COLONY COUNT: CPT | Performed by: STUDENT IN AN ORGANIZED HEALTH CARE EDUCATION/TRAINING PROGRAM

## 2023-01-01 PROCEDURE — 83735 ASSAY OF MAGNESIUM: CPT | Performed by: EMERGENCY MEDICINE

## 2023-01-01 PROCEDURE — 25500020 PHARM REV CODE 255: Performed by: EMERGENCY MEDICINE

## 2023-01-01 PROCEDURE — 93010 EKG 12-LEAD: ICD-10-PCS | Mod: ,,, | Performed by: INTERNAL MEDICINE

## 2023-01-01 PROCEDURE — 96372 THER/PROPH/DIAG INJ SC/IM: CPT | Performed by: STUDENT IN AN ORGANIZED HEALTH CARE EDUCATION/TRAINING PROGRAM

## 2023-01-01 PROCEDURE — 99285 EMERGENCY DEPT VISIT HI MDM: CPT | Mod: ,,, | Performed by: EMERGENCY MEDICINE

## 2023-01-01 PROCEDURE — 25000003 PHARM REV CODE 250: Performed by: EMERGENCY MEDICINE

## 2023-01-01 PROCEDURE — 82746 ASSAY OF FOLIC ACID SERUM: CPT

## 2023-01-01 PROCEDURE — 96375 TX/PRO/DX INJ NEW DRUG ADDON: CPT

## 2023-01-01 RX ORDER — SODIUM CHLORIDE, SODIUM LACTATE, POTASSIUM CHLORIDE, CALCIUM CHLORIDE 600; 310; 30; 20 MG/100ML; MG/100ML; MG/100ML; MG/100ML
INJECTION, SOLUTION INTRAVENOUS CONTINUOUS
Status: ACTIVE | OUTPATIENT
Start: 2023-01-01 | End: 2023-01-01

## 2023-01-01 RX ORDER — PSEUDOEPHEDRINE/ACETAMINOPHEN 30MG-500MG
100 TABLET ORAL
Status: DISCONTINUED | OUTPATIENT
Start: 2023-01-01 | End: 2023-01-01

## 2023-01-01 RX ORDER — SODIUM CHLORIDE 0.9 % (FLUSH) 0.9 %
10 SYRINGE (ML) INJECTION EVERY 12 HOURS PRN
Status: DISCONTINUED | OUTPATIENT
Start: 2023-01-01 | End: 2023-05-24 | Stop reason: HOSPADM

## 2023-01-01 RX ORDER — ASPIRIN 325 MG
325 TABLET, DELAYED RELEASE (ENTERIC COATED) ORAL ONCE
Status: DISCONTINUED | OUTPATIENT
Start: 2023-01-01 | End: 2023-05-24 | Stop reason: HOSPADM

## 2023-01-01 RX ORDER — FAMOTIDINE 10 MG/ML
20 INJECTION INTRAVENOUS 2 TIMES DAILY
Status: DISCONTINUED | OUTPATIENT
Start: 2023-01-01 | End: 2023-01-01

## 2023-01-01 RX ORDER — GLYCERIN 1 G/1
1 SUPPOSITORY RECTAL ONCE
Status: COMPLETED | OUTPATIENT
Start: 2023-01-01 | End: 2023-01-01

## 2023-01-01 RX ORDER — LEVALBUTEROL INHALATION SOLUTION 0.63 MG/3ML
0.63 SOLUTION RESPIRATORY (INHALATION) ONCE
Status: DISCONTINUED | OUTPATIENT
Start: 2023-01-01 | End: 2023-05-24 | Stop reason: HOSPADM

## 2023-01-01 RX ORDER — SODIUM CHLORIDE 9 MG/ML
INJECTION, SOLUTION INTRAVENOUS CONTINUOUS
Status: DISCONTINUED | OUTPATIENT
Start: 2023-01-01 | End: 2023-01-01

## 2023-01-01 RX ORDER — DEXTROSE 40 %
15 GEL (GRAM) ORAL
Status: DISCONTINUED | OUTPATIENT
Start: 2023-01-01 | End: 2023-05-24 | Stop reason: HOSPADM

## 2023-01-01 RX ORDER — HEPARIN SODIUM 5000 [USP'U]/ML
5000 INJECTION, SOLUTION INTRAVENOUS; SUBCUTANEOUS EVERY 12 HOURS
Status: DISCONTINUED | OUTPATIENT
Start: 2023-01-01 | End: 2023-01-01

## 2023-01-01 RX ORDER — NIFEDIPINE 60 MG/1
60 TABLET, EXTENDED RELEASE ORAL DAILY
Status: DISCONTINUED | OUTPATIENT
Start: 2023-01-01 | End: 2023-01-01

## 2023-01-01 RX ORDER — GLUCAGON 1 MG
1 KIT INJECTION
Status: DISCONTINUED | OUTPATIENT
Start: 2023-01-01 | End: 2023-01-01

## 2023-01-01 RX ORDER — HYDRALAZINE HYDROCHLORIDE 20 MG/ML
5 INJECTION INTRAMUSCULAR; INTRAVENOUS EVERY 8 HOURS PRN
Status: DISCONTINUED | OUTPATIENT
Start: 2023-01-01 | End: 2023-05-24 | Stop reason: HOSPADM

## 2023-01-01 RX ORDER — SODIUM CHLORIDE 9 MG/ML
1000 INJECTION, SOLUTION INTRAVENOUS
Status: DISCONTINUED | OUTPATIENT
Start: 2023-01-01 | End: 2023-01-01

## 2023-01-01 RX ORDER — CLOPIDOGREL BISULFATE 75 MG/1
75 TABLET ORAL DAILY
Status: DISCONTINUED | OUTPATIENT
Start: 2023-05-24 | End: 2023-05-24 | Stop reason: HOSPADM

## 2023-01-01 RX ORDER — FAMOTIDINE 10 MG/ML
20 INJECTION INTRAVENOUS DAILY
Status: DISCONTINUED | OUTPATIENT
Start: 2023-01-01 | End: 2023-05-24 | Stop reason: HOSPADM

## 2023-01-01 RX ORDER — ENOXAPARIN SODIUM 100 MG/ML
30 INJECTION SUBCUTANEOUS EVERY 24 HOURS
Status: DISCONTINUED | OUTPATIENT
Start: 2023-01-01 | End: 2023-01-01

## 2023-01-01 RX ORDER — GLUCAGON 1 MG
1 KIT INJECTION
Status: DISCONTINUED | OUTPATIENT
Start: 2023-01-01 | End: 2023-05-24 | Stop reason: HOSPADM

## 2023-01-01 RX ORDER — BISACODYL 10 MG
10 SUPPOSITORY, RECTAL RECTAL DAILY PRN
Status: DISCONTINUED | OUTPATIENT
Start: 2023-01-01 | End: 2023-05-24 | Stop reason: HOSPADM

## 2023-01-01 RX ORDER — LEVOTHYROXINE SODIUM 75 UG/1
75 TABLET ORAL
Status: DISCONTINUED | OUTPATIENT
Start: 2023-01-01 | End: 2023-01-01

## 2023-01-01 RX ORDER — ASPIRIN 81 MG/1
81 TABLET ORAL DAILY
Status: DISCONTINUED | OUTPATIENT
Start: 2023-05-24 | End: 2023-05-24 | Stop reason: HOSPADM

## 2023-01-01 RX ORDER — HYDROCODONE BITARTRATE AND ACETAMINOPHEN 500; 5 MG/1; MG/1
TABLET ORAL CONTINUOUS
Status: DISCONTINUED | OUTPATIENT
Start: 2023-01-01 | End: 2023-01-01

## 2023-01-01 RX ORDER — INSULIN ASPART 100 [IU]/ML
0-5 INJECTION, SOLUTION INTRAVENOUS; SUBCUTANEOUS EVERY 6 HOURS PRN
Status: DISCONTINUED | OUTPATIENT
Start: 2023-01-01 | End: 2023-05-24 | Stop reason: HOSPADM

## 2023-01-01 RX ORDER — DEXTROSE 40 %
30 GEL (GRAM) ORAL
Status: DISCONTINUED | OUTPATIENT
Start: 2023-01-01 | End: 2023-05-24 | Stop reason: HOSPADM

## 2023-01-01 RX ORDER — FUROSEMIDE 10 MG/ML
40 INJECTION INTRAMUSCULAR; INTRAVENOUS ONCE
Status: COMPLETED | OUTPATIENT
Start: 2023-01-01 | End: 2023-01-01

## 2023-01-01 RX ORDER — CLOPIDOGREL BISULFATE 75 MG/1
300 TABLET ORAL ONCE
Status: DISCONTINUED | OUTPATIENT
Start: 2023-01-01 | End: 2023-05-24 | Stop reason: HOSPADM

## 2023-01-01 RX ORDER — SYRING-NEEDL,DISP,INSUL,0.3 ML 29 G X1/2"
296 SYRINGE, EMPTY DISPOSABLE MISCELLANEOUS
Status: DISCONTINUED | OUTPATIENT
Start: 2023-01-01 | End: 2023-01-01

## 2023-01-01 RX ORDER — NIFEDIPINE 30 MG/1
60 TABLET, EXTENDED RELEASE ORAL DAILY
Status: DISCONTINUED | OUTPATIENT
Start: 2023-01-01 | End: 2023-01-01

## 2023-01-01 RX ORDER — HEPARIN SODIUM,PORCINE/D5W 25000/250
0-40 INTRAVENOUS SOLUTION INTRAVENOUS CONTINUOUS
Status: DISCONTINUED | OUTPATIENT
Start: 2023-01-01 | End: 2023-05-24 | Stop reason: HOSPADM

## 2023-01-01 RX ORDER — AZITHROMYCIN 250 MG/1
250 TABLET, FILM COATED ORAL DAILY
Status: DISCONTINUED | OUTPATIENT
Start: 2023-01-01 | End: 2023-01-01

## 2023-01-01 RX ORDER — DEXTROMETHORPHAN POLISTIREX 30 MG/5 ML
1 SUSPENSION, EXTENDED RELEASE 12 HR ORAL DAILY PRN
Status: DISCONTINUED | OUTPATIENT
Start: 2023-01-01 | End: 2023-05-24 | Stop reason: HOSPADM

## 2023-01-01 RX ADMIN — FUROSEMIDE 40 MG: 10 INJECTION, SOLUTION INTRAMUSCULAR; INTRAVENOUS at 07:05

## 2023-01-01 RX ADMIN — FAMOTIDINE 20 MG: 10 INJECTION INTRAVENOUS at 08:05

## 2023-01-01 RX ADMIN — SODIUM CHLORIDE, POTASSIUM CHLORIDE, SODIUM LACTATE AND CALCIUM CHLORIDE: 600; 310; 30; 20 INJECTION, SOLUTION INTRAVENOUS at 02:05

## 2023-01-01 RX ADMIN — ENOXAPARIN SODIUM 30 MG: 30 INJECTION SUBCUTANEOUS at 09:05

## 2023-01-01 RX ADMIN — FUROSEMIDE 40 MG: 10 INJECTION, SOLUTION INTRAMUSCULAR; INTRAVENOUS at 06:05

## 2023-01-01 RX ADMIN — PIPERACILLIN AND TAZOBACTAM 4.5 G: 4; .5 INJECTION, POWDER, LYOPHILIZED, FOR SOLUTION INTRAVENOUS; PARENTERAL at 10:05

## 2023-01-01 RX ADMIN — HEPARIN SODIUM 5000 UNITS: 5000 INJECTION INTRAVENOUS; SUBCUTANEOUS at 10:05

## 2023-01-01 RX ADMIN — FAMOTIDINE 20 MG: 10 INJECTION INTRAVENOUS at 09:05

## 2023-01-01 RX ADMIN — GLYCERIN 1 SUPPOSITORY: 2 SUPPOSITORY RECTAL at 12:05

## 2023-01-01 RX ADMIN — CEFTRIAXONE 1 G: 1 INJECTION, POWDER, FOR SOLUTION INTRAMUSCULAR; INTRAVENOUS at 01:05

## 2023-01-01 RX ADMIN — SODIUM CHLORIDE: 9 INJECTION, SOLUTION INTRAVENOUS at 08:05

## 2023-01-01 RX ADMIN — SODIUM CHLORIDE 500 ML: 9 INJECTION, SOLUTION INTRAVENOUS at 09:05

## 2023-01-01 RX ADMIN — HEPARIN SODIUM 5000 UNITS: 5000 INJECTION INTRAVENOUS; SUBCUTANEOUS at 12:05

## 2023-01-01 RX ADMIN — IOHEXOL 75 ML: 350 INJECTION, SOLUTION INTRAVENOUS at 10:05

## 2023-01-01 RX ADMIN — HEPARIN SODIUM 5000 UNITS: 5000 INJECTION INTRAVENOUS; SUBCUTANEOUS at 08:05

## 2023-01-01 RX ADMIN — HEPARIN SODIUM 12 UNITS/KG/HR: 10000 INJECTION, SOLUTION INTRAVENOUS at 06:05

## 2023-01-01 RX ADMIN — AZITHROMYCIN MONOHYDRATE 500 MG: 500 INJECTION, POWDER, LYOPHILIZED, FOR SOLUTION INTRAVENOUS at 03:05

## 2023-01-01 RX ADMIN — PIPERACILLIN AND TAZOBACTAM 4.5 G: 4; .5 INJECTION, POWDER, LYOPHILIZED, FOR SOLUTION INTRAVENOUS; PARENTERAL at 11:05

## 2023-01-01 RX ADMIN — AZITHROMYCIN MONOHYDRATE 500 MG: 500 INJECTION, POWDER, LYOPHILIZED, FOR SOLUTION INTRAVENOUS at 12:05

## 2023-05-20 NOTE — ED NOTES
Patient identifiers verified and correct for Tatiana Therence,   LOC: The patient is awake, alert, unable to assess orientation (pt not answering)  APPEARANCE: Patient appears comfortable and in no acute distress, pt arrived to ED bed with diaper wet.   SKIN: The skin is warm and dry, color consistent with ethnicity, patient has normal skin turgor and moist mucus membranes, skin intact, no breakdown or bruising noted.   MUSCULOSKELETAL: Patient refusing to move extremities. CARLIE  RESPIRATORY: Airway is open and patent, respirations are spontaneous, patient has a normal effort and rate, no accessory muscle use noted, pt placed on continuous pulse ox with O2 sats noted at 97% on room air.  CARDIAC: Pt placed on cardiac monitor. Patient bradycardic, mild BLE swelling, capillary refill < 3 seconds.   GASTRO: Soft and tender to palpation, hernia noted to abdomen. Family states pt has been constipated and have been giving her OTC laxatives   : CARLIE.   NEURO: Pt opens eyes spontaneously, behavior appropriate to situation, minimal assessment due to pt status

## 2023-05-20 NOTE — ED TRIAGE NOTES
Pt brought by family from home due to hyperglycemia (300s). Family states pt has been lethargic, normal able to communicate, and decrease oral intake.  Unable to get verbal response from pt but opens eyes spontaneously. Pt diaper soiled. Pt cleaned and placed on monitor.

## 2023-05-21 PROBLEM — D53.9 MACROCYTIC ANEMIA: Status: ACTIVE | Noted: 2023-01-01

## 2023-05-21 PROBLEM — Z71.89 GOALS OF CARE, COUNSELING/DISCUSSION: Status: ACTIVE | Noted: 2023-01-01

## 2023-05-21 PROBLEM — G93.41 ACUTE METABOLIC ENCEPHALOPATHY: Status: ACTIVE | Noted: 2023-01-01

## 2023-05-21 PROBLEM — D72.829 LEUKOCYTOSIS: Status: ACTIVE | Noted: 2023-01-01

## 2023-05-21 PROBLEM — A41.9 SEVERE SEPSIS: Status: ACTIVE | Noted: 2023-01-01

## 2023-05-21 PROBLEM — R41.82 ALTERED MENTAL STATUS: Status: ACTIVE | Noted: 2023-01-01

## 2023-05-21 PROBLEM — R73.9 HYPERGLYCEMIA: Status: ACTIVE | Noted: 2023-01-01

## 2023-05-21 PROBLEM — K59.00 CONSTIPATION: Status: ACTIVE | Noted: 2023-01-01

## 2023-05-21 PROBLEM — R65.20 SEVERE SEPSIS: Status: ACTIVE | Noted: 2023-01-01

## 2023-05-21 PROBLEM — J96.01 ACUTE HYPOXEMIC RESPIRATORY FAILURE: Status: ACTIVE | Noted: 2023-01-01

## 2023-05-21 PROBLEM — R62.7 ADULT FAILURE TO THRIVE: Status: ACTIVE | Noted: 2023-01-01

## 2023-05-21 PROBLEM — I44.1 AV BLOCK, 2ND DEGREE: Status: ACTIVE | Noted: 2023-01-01

## 2023-05-21 PROBLEM — R79.89 ELEVATED BRAIN NATRIURETIC PEPTIDE (BNP) LEVEL: Status: ACTIVE | Noted: 2023-01-01

## 2023-05-21 PROBLEM — I16.0 HYPERTENSIVE URGENCY: Status: ACTIVE | Noted: 2023-01-01

## 2023-05-21 PROBLEM — E87.5 HYPERKALEMIA: Status: ACTIVE | Noted: 2023-01-01

## 2023-05-21 PROBLEM — F03.90 DEMENTIA WITHOUT BEHAVIORAL DISTURBANCE, PSYCHOTIC DISTURBANCE, MOOD DISTURBANCE, OR ANXIETY: Status: ACTIVE | Noted: 2023-01-01

## 2023-05-21 PROBLEM — I10 HYPERTENSION: Status: ACTIVE | Noted: 2023-01-01

## 2023-05-21 PROBLEM — G93.40 ENCEPHALOPATHY: Status: ACTIVE | Noted: 2023-01-01

## 2023-05-21 NOTE — SUBJECTIVE & OBJECTIVE
Past Medical History:   Diagnosis Date    Diabetes mellitus     Hypertension     Thyroid disease        Past Surgical History:   Procedure Laterality Date    CHOLECYSTECTOMY         Review of patient's allergies indicates:  No Known Allergies    No current facility-administered medications on file prior to encounter.     Current Outpatient Medications on File Prior to Encounter   Medication Sig    glipiZIDE (GLUCOTROL) 5 MG tablet Take 5 mg by mouth 2 (two) times daily before meals.    LEVOTHYROXINE SODIUM (LEVOTHYROXINE ORAL) Take by mouth.    metformin (GLUCOPHAGE) 500 MG tablet Take 500 mg by mouth 2 (two) times daily with meals.    NIFEDIPINE (NIFEDICAL XL ORAL) Take by mouth.     Family History    None       Tobacco Use    Smoking status: Not on file    Smokeless tobacco: Not on file   Substance and Sexual Activity    Alcohol use: Not on file    Drug use: Not on file    Sexual activity: Not on file     Review of Systems   Unable to perform ROS: Mental status change   Objective:     Vital Signs (Most Recent):  Temp: 98.2 °F (36.8 °C) (05/21/23 0744)  Pulse: 96 (05/21/23 0744)  Resp: 14 (05/21/23 0744)  BP: (!) 155/82 (05/21/23 0744)  SpO2: (!) 91 % (05/21/23 0744) Vital Signs (24h Range):  Temp:  [98 °F (36.7 °C)-98.2 °F (36.8 °C)] 98.2 °F (36.8 °C)  Pulse:  [] 96  Resp:  [14-26] 14  SpO2:  [90 %-100 %] 91 %  BP: (131-209)/() 155/82        There is no height or weight on file to calculate BMI.     Physical Exam  Constitutional:       Comments: Altered making incomprehensible sound.   Thin and cachectic.    HENT:      Head: Normocephalic and atraumatic.      Nose: Nose normal.      Mouth/Throat:      Mouth: Mucous membranes are moist.      Pharynx: Oropharynx is clear.   Eyes:      Extraocular Movements: Extraocular movements intact.      Conjunctiva/sclera: Conjunctivae normal.      Pupils: Pupils are equal, round, and reactive to light.   Cardiovascular:      Rate and Rhythm: Normal rate and regular  rhythm.      Pulses: Normal pulses.      Heart sounds: Normal heart sounds.      Comments: No JVD appreciated.   Pulmonary:      Effort: Pulmonary effort is normal.      Breath sounds: Normal breath sounds.      Comments: On NC;   Abdominal:      Comments: Abdomen distended   Ventral hernia noted   Hypoactive bowel sound    Musculoskeletal:      Right lower leg: No edema.      Left lower leg: No edema.      Comments: Unable to follow commands    Skin:     General: Skin is warm and dry.   Neurological:      General: No focal deficit present.      Mental Status: She is disoriented.          CRANIAL NERVES     CN III, IV, VI   Pupils are equal, round, and reactive to light.     Significant Labs: All pertinent labs within the past 24 hours have been reviewed.    Significant Imaging: I have reviewed all pertinent imaging results/findings within the past 24 hours.

## 2023-05-21 NOTE — ASSESSMENT & PLAN NOTE
Advance Care Planning     Pt has been made DNR by the ED physician.     [facility]  I engaged the family in a conversation about advance care planning and we specifically addressed what the goals of care would be moving forward, in light of the patient's change in clinical status, specifically pt's mental status.  We did specifically address the patient's likely prognosis, which is poor.  We explored the patient's values and preferences for future care.  The family endorses that what is most important right now is to focus on symptom/pain control and and controlling hyperglycemia, constipation, and high blood pressure.     Accordingly, we have decided that the best plan to meet the patient's goals includes treatment targeting the above focus and reevaluate periodically.    I spent a total of 15 minutes engaging the patient in this advance care planning discussion.

## 2023-05-21 NOTE — PLAN OF CARE
Patient in bed resting quietly, eye open to physical stimuli, breathing even and unlabored, no signs of distrss noted, O2 @ 2L per nasal canula in progress, IV abts initiated today, pt tolerated well, will continue to monitor.    Problem: Adult Inpatient Plan of Care  Goal: Plan of Care Review  Outcome: Ongoing, Progressing  Flowsheets (Taken 5/21/2023 1753)  Plan of Care Reviewed With: patient  Goal: Optimal Comfort and Wellbeing  Outcome: Ongoing, Progressing  Intervention: Monitor Pain and Promote Comfort  Flowsheets (Taken 5/21/2023 1753)  Pain Management Interventions:   position adjusted   quiet environment facilitated   relaxation techniques promoted

## 2023-05-21 NOTE — CARE UPDATE
Started septic work-up w/urinalysis and culture, as well as blood culture. Concerned for aspiration, and will cover for aspiration/CAP w/Rocephin and azithromycin. Appears volume-down on exam, with likely poor intake at home: giving gentle iv fluids and ordered additional suppository. Physical exam notable for a&o x0, does not follow commands, abdomen distended and tender to palpation though w/o surgical signs, decubitus ulcer present over sacrum. Patient smells of urine. Of note, EKGs w/likely Mobitz II, and heart rate quite variable. May represent sick sinus syndrome, however we are not pursuing pacing/pacemaker at this time due to patient's advanced age and overall condition.    Discussed plan w/patient's son at bedside. Patient lives at home with him, is somewhat interactive with him, and able to feed herself at baseline.    Staffed w/Dr. Vail.      ELISABETH Zheng D.O.

## 2023-05-21 NOTE — NURSING
Nurses Note -- 4 Eyes      5/21/2023   6:48 AM      Skin assessed during: Admit      [x] No Altered Skin Integrity Present    []Prevention Measures Documented      [] Yes- Altered Skin Integrity Present or Discovered   [] LDA Added if Not in Epic (Describe Wound)   [] New Altered Skin Integrity was Present on Admit and Documented in LDA   [] Wound Image Taken    Wound Care Consulted? No    Attending Nurse:  Doug Grissom RN     Second RN/Staff Member:  BRIANNA Parker

## 2023-05-21 NOTE — PLAN OF CARE
Bedside swallow study completed. Recommend NPO diet at this time. SLP will follow up.   5/21/2023

## 2023-05-21 NOTE — PT/OT/SLP PROGRESS
Occupational Therapy      Patient Name:  Tatiana Sparrow   MRN:  [id number]    Patient not seen today secondary to pt was unable to follow commands and was disoriented.  Her speech was nonsensical and she was unable to verbalize.  No family present.  PT attempted to contact pt's family but was unable to reach them.  Will follow-up 5/22/2023.    5/21/2023

## 2023-05-21 NOTE — PT/OT/SLP EVAL
Speech Language Pathology Evaluation  Bedside Swallow    Patient Name:  Tatiana Sparrow   MRN:  [id number]  Admitting Diagnosis: Altered mental status    Recommendations:                 General Recommendations:  Dysphagia therapy  Diet recommendations:  NPO, NPO   Aspiration Precautions: Strict aspiration precautions   General Precautions: Standard, aspiration  Communication strategies:  provide increased time to answer    Assessment:     Tatiana Sparrow is a 89 y.o. female with an SLP diagnosis of Dysphagia.      History:     Past Medical History:   Diagnosis Date    Diabetes mellitus     Hypertension     Thyroid disease        Past Surgical History:   Procedure Laterality Date    CHOLECYSTECTOMY         Social History: Patient unable to provide hx     Prior Intubation HX:  none this admit    Modified Barium Swallow: none this admit    Chest X-Rays: 5/20 Bibasilar mild nonspecific interstitial coarsening with differential considerations above.  No consolidative process.     Suspected several bilateral small pulmonary nodules.  Further evaluation with elective/nonemergent CT thorax can be obtained if 2 year stability cannot be confirmed.         Subjective     Awake yet lethargic  Pain/Comfort:  Pain Rating 1: 0/10  Pain Rating Post-Intervention 1: 0/10    Respiratory Status: Nasal cannula    Objective:     Oral Musculature Evaluation  Oral Musculature: unable to assess due to poor participation/comprehension    Bedside Swallow Eval:   Consistencies Assessed:  Thin liquids tsp x2      Oral Phase:   Pt made no attempt to orally accept initial bolus. She did open mouth when spoon presented a second time. Pt had anterior loss of entire bolus upon second attempt.     Pharyngeal Phase:   No pharyngeal swallow initiated      Goals:   Multidisciplinary Problems       SLP Goals          Problem: SLP    Goal Priority Disciplines Outcome   SLP Goal     SLP Ongoing, Progressing   Description: Speech Language Pathology  Goals  Goals expected to be met by 5/28    1. Pt will participate in ongoing swallow assessment                                Plan:     Patient to be seen:  3 x/week   Plan of Care reviewed with:      SLP Follow-Up:  Yes       Discharge recommendations:    TBD    Time Tracking:     SLP Treatment Date:   05/21/23  Speech Start Time:  0946  Speech Stop Time:  0951     Speech Total Time (min):  5 min    Billable Minutes: Eval Swallow and Oral Function 5    05/21/2023

## 2023-05-21 NOTE — ASSESSMENT & PLAN NOTE
No blood in the stool reported. Macrocytic anemia; Nutritional def part of ddx.     - Sent in iron studies, B12, retic count.

## 2023-05-21 NOTE — ASSESSMENT & PLAN NOTE
89-year-old with past medical history of T2 DM, hypertension, thyroid disease, presenting for AMS, constipation, hyperglycemia. Over the phone with son, it was difficult to assess pt's baseline status. Per son, at baseline pt can recognize son and can make minimal conversation. However, over days to weeks, pt's mental status has declined; now, she is making incomprehensible sound. CT scan of the abdomen showed constipation. BP elevated. . Given pt is 89 years old, discussed with pt's son what should be the goal of treatment. Per son, if we can get bg on control, constipation and bp managed, then that will satisfy. Son did not seem wanted MRI or intensive evaluation for AMS. However, conversation was limited over phone.         DDX of AMS includes: infectious, metabolic, toxin, vascular, drugs, constipation.   - Will treat for constipation, known cause of AMS in elderly.   - Not septic per VS.   - Monitor glucose (see hyperglycemia) and electrolytes.   - Monitor polypharmacy.   - See GOC; reevaluate with son when he come to see pt in person.

## 2023-05-21 NOTE — ED NOTES
Pt gerson Jc [phone] requested to be called and updated when pt gets a room and when pt arrives upstairs.

## 2023-05-21 NOTE — ASSESSMENT & PLAN NOTE
Noted to have hyperglycemia. However, pt is not eating well and she is elderly. I worry that starting long acting when pt is not eating can inadvertently drop glucose;     - Trend glucose q6 and place on low dose sliding scale.

## 2023-05-21 NOTE — ED PROVIDER NOTES
"Encounter Date: 5/20/2023       History     Chief Complaint   Patient presents with    Hyperglycemia     Son provides history. CBG 300s, received metformin PTA. No eating/drinking     89-year-old with past medical history of T2 DM, hypertension, thyroid disease, presenting for hyperglycemia x1 day.    Son reports patient has not been drinking, although she is eating, but he noticed that her blood sugar was 300s  today.  He tried to give her metformin but she may have spat it out.  He states that she is not had a bowel movement several days, no relief for MiraLax so he gave her Mag citrate today, he feels that she has stool ready to pass but not yet.  He states that today she is fatigued, "like her blood sugar is high".     Review of patient's allergies indicates:  No Known Allergies  Past Medical History:   Diagnosis Date    Diabetes mellitus     Hypertension     Thyroid disease      Past Surgical History:   Procedure Laterality Date    CHOLECYSTECTOMY       History reviewed. No pertinent family history.     Review of Systems    Physical Exam     Initial Vitals   BP Pulse Resp Temp SpO2   05/20/23 1800 05/20/23 1800 05/20/23 1800 05/20/23 1829 05/20/23 1800   (!) 167/72 61 16 98 °F (36.7 °C) 98 %      MAP       --                Physical Exam    Nursing note and vitals reviewed.  Constitutional: She appears well-developed. She is not diaphoretic. No distress.   Cachectic   HENT:   Head: Normocephalic and atraumatic.   Nose: Nose normal.   Eyes: EOM are normal. Pupils are equal, round, and reactive to light. No scleral icterus.   Neck: Neck supple.   Normal range of motion.  Cardiovascular:  Regular rhythm and intact distal pulses.           Bradycardic   Pulmonary/Chest: Breath sounds normal. No respiratory distress. She has no wheezes. She has no rhonchi. She has no rales. She exhibits no tenderness.   Abdominal: Abdomen is soft. Bowel sounds are normal. She exhibits distension. There is abdominal tenderness " (Grimaces with deep tenderness to palpation).   Musculoskeletal:         General: Normal range of motion.      Cervical back: Normal range of motion and neck supple.     Neurological: She is alert. She has normal strength.   Opens eyes to verbal command by her son and tracks, does not follow commands   Skin: Skin is warm and dry. Capillary refill takes less than 2 seconds. No rash noted.   Psychiatric: Her behavior is normal.       ED Course   Procedures  Labs Reviewed   CBC W/ AUTO DIFFERENTIAL - Abnormal; Notable for the following components:       Result Value    WBC 14.84 (*)     RBC 2.99 (*)     Hemoglobin 9.9 (*)     Hematocrit 32.0 (*)      (*)     MCH 33.1 (*)     MCHC 30.9 (*)     Gran # (ANC) 13.3 (*)     Immature Grans (Abs) 0.08 (*)     Lymph # 0.6 (*)     Gran % 89.5 (*)     Lymph % 4.2 (*)     All other components within normal limits   COMPREHENSIVE METABOLIC PANEL - Abnormal; Notable for the following components:    Potassium 5.3 (*)     CO2 20 (*)     Glucose 277 (*)     BUN 48 (*)     Albumin 3.2 (*)     AST 45 (*)     eGFR 49.7 (*)     All other components within normal limits    Narrative:     Add on TSH per Dr. Canela @ 19:16 pm to order # [id number]   TROPONIN I - Abnormal; Notable for the following components:    Troponin I 0.090 (*)     All other components within normal limits    Narrative:     Add on TSH per Dr. Canela @ 19:16 pm to order # [id number]   TROPONIN I - Abnormal; Notable for the following components:    Troponin I 0.074 (*)     All other components within normal limits   B-TYPE NATRIURETIC PEPTIDE - Abnormal; Notable for the following components:     (*)     All other components within normal limits   POCT GLUCOSE - Abnormal; Notable for the following components:    POCT Glucose 269 (*)     All other components within normal limits   POCT GLUCOSE - Abnormal; Notable for the following components:    POCT Glucose 289 (*)     All other components within normal  limits   BETA - HYDROXYBUTYRATE, SERUM   MAGNESIUM    Narrative:     Add on TSH per Dr. Canela @ 19:16 pm to order # [id number]   TSH   TSH    Narrative:     Add on TSH per Dr. Canela @ 19:16 pm to order # [id number]        ECG Results              EKG 12-lead (Final result)  Result time 05/21/23 09:08:16      Final result by Interface, Lab In [facility] (05/21/23 09:08:16)                   Narrative:    Test Reason : R00.1,    Vent. Rate : 053 BPM     Atrial Rate : 053 BPM     P-R Int : 182 ms          QRS Dur : 096 ms      QT Int : 498 ms       P-R-T Axes : 057 023 -42 degrees     QTc Int : 467 ms    Sinus bradycardia with marked sinus arrythmia  Anterior infarct (cited on or before 20-MAY-2023)  Abnormal ECG  When compared with ECG of 20-MAY-2023 18:41,  T wave inversion no longer evident in Lateral leads  Confirmed by Angelia Belle MD ([id number]) on 5/21/2023 9:08:07 AM    Referred By: [email or web address]   SELF           Confirmed By:Angelia Belle MD                                  Imaging Results               CT Abdomen Pelvis With Contrast (Final result)  Result time 05/20/23 23:35:47      Final result by Lee Ellis MD (05/20/23 23:35:47)                   Impression:      1. Prominent fecal distention of the rectum with retained feces in the colon also.  Rectal impaction is a possibility.  See above comments.  Follow-up recommended.  2. Moderate hiatal hernia.  3. Nondistention of the stomach with possible wall thickening/gastritis.  4.  This report was flagged in Epic as abnormal.      Electronically signed by: Lee Ellis  Date:    05/20/2023  Time:    23:35               Narrative:    EXAMINATION:  CT ABDOMEN PELVIS WITH CONTRAST    CLINICAL HISTORY:  Abdominal pain, acute, nonlocalized;    TECHNIQUE:  Low dose axial images, sagittal and coronal reformations were obtained from the lung bases to the pubic symphysis following the IV administration of 75 mL of Omnipaque 350 .  Oral contrast was not  administered.    COMPARISON:  None.    FINDINGS:  The patient is rotated to the right obscuring visualization.    Abdomen:    - Lower thorax:Moderate hiatal hernia.    - Lung bases: Calcified granuloma at the right lung base with mild associated atelectasis.    - Liver: No focal mass.    - Gallbladder: Gallbladder is not identified.    - Bile Ducts: No evidence of intra or extra hepatic biliary ductal dilation.    - Spleen: Negative.    - Kidneys: Extrarenal pelvis configuration bilaterally.  No stone, soft tissue mass or hydronephrosis.  Small probable cyst on the right.    - Adrenals: Unremarkable.    - Pancreas: No mass or peripancreatic fat stranding.    - Retroperitoneum:  No significant adenopathy.    - Vascular: No abdominal aortic aneurysm.    - Abdominal wall:  Unremarkable.    Pelvis:    Urinary bladder is adequately maintained.    The uterus is not well visualized may be small removed.    Bowel/Mesentery:    There is prominent fecal distention of the rectum.  There is moderate retained feces and gas in the colon also.  Rectal impaction is a possibility.    The stomach is nondistended.  Mild wall thickening/gastritis is a possibility.  Mild adjacent stranding.    Bones:  No acute osseous abnormality and no suspicious lytic or blastic lesion.    Edema in the subcutaneous tissues.                                       CT Head Without Contrast (Final result)  Result time 05/20/23 23:23:47      Final result by Lee Ellis MD (05/20/23 23:23:47)                   Impression:      1. No acute intracranial process.  2. Involutional changes with chronic microvascular ischemic changes.      Electronically signed by: Lee Ellis  Date:    05/20/2023  Time:    23:23               Narrative:    EXAMINATION:  CT HEAD WITHOUT CONTRAST    CLINICAL HISTORY:  Mental status change, unknown cause;    TECHNIQUE:  Low dose axial images were obtained through the head.  coronal and sagittal reformations were also  performed. Contrast was not administered.    COMPARISON:  None.    FINDINGS:  The patient is rotated to the right obscuring visualization.    No midline shift or acute hydrocephalus.    Moderate-severe involutional changes with chronic microvascular ischemic changes in the periventricular white matter.    No evidence of any focal mass or hemorrhage.  No calvarial fractures.    Paranasal sinuses and mastoid air cells are clear.                                       X-Ray Chest AP Portable (Final result)  Result time 05/20/23 19:42:45      Final result by Carlos Resendiz MD (05/20/23 19:42:45)                   Impression:      Bibasilar mild nonspecific interstitial coarsening with differential considerations above.  No consolidative process.    Suspected several bilateral small pulmonary nodules.  Further evaluation with elective/nonemergent CT thorax can be obtained if 2 year stability cannot be confirmed.      Electronically signed by: Carlos Resendiz MD  Date:    05/20/2023  Time:    19:42               Narrative:    EXAMINATION:  XR CHEST AP PORTABLE    CLINICAL HISTORY:  hyperglycemia;    TECHNIQUE:  Single frontal view of the chest was performed.    COMPARISON:  None    FINDINGS:  Monitoring leads overlie the chest.  Patient is slightly rotated.    Mediastinal structures are midline.  There is calcification and tortuosity of the aorta.  Cardiac silhouette is mildly enlarged without convincing evidence of failure.  Pulmonary vasculature and hilar contours are within normal limits.  There is nonspecific elevation of the right hemidiaphragm.    Skin folds overlie the left chest.  There is bibasilar platelike scarring versus atelectasis.  There is also bibasilar mild nonspecific interstitial coarsening which can be seen with pulmonary edema, interstitial type pneumonia or chronic interstitial lung changes.  Few scattered small nodules noted throughout each lung.  No large consolidation, pleural effusion or  pneumothorax.    No acute osseous process seen.  1.3 cm calcific density projects over the medial lower right neck near the thoracic inlet which may reflect large calcified thyroid nodule versus calcified lymph node.  PA and lateral views can be obtained.                                       Medications   lactated ringers infusion ( Intravenous New Bag 5/21/23 1455)   sodium chloride 0.9% bolus 500 mL 500 mL (0 mLs Intravenous Stopped 5/20/23 2240)   iohexoL (OMNIPAQUE 350) injection 75 mL (75 mLs Intravenous Given 5/20/23 2240)   furosemide injection 40 mg (40 mg Intravenous Given 5/21/23 0745)   glycerin adult suppository 1 suppository (1 suppository Rectal Given 5/21/23 1215)   furosemide injection 40 mg (40 mg Intravenous Given 5/23/23 1841)   heparin 25,000 units in dextrose 5% (100 units/ml) IV bolus from bag INITIAL BOLUS (max bolus 4000 units) (2,890 Units Intravenous Bolus from Bag 5/23/23 1836)     Medical Decision Making:   History:   Old Medical Records: I decided to obtain old medical records.  Old Records Summarized: records from clinic visits and records from previous admission(s).       <> Summary of Records: Patient last seen in ED 2017 for dependent edema  Initial Assessment:   Patient is frail, abdomen distended and mildly tender to palpation, patient opens eyes to son's voice but not following commands although moving all extremities and do not suspect CVA.  She is bradycardic to 46, son says that her heart rate is typically in the 70s.  Will investigate infectious, metabolic, and ACS etiology of hyperglycemia and bradycardia.  Independently Interpreted Test(s):   I have ordered and independently interpreted EKG Reading(s) - see summary below  Clinical Tests:   Lab Tests: Ordered and Reviewed  Radiological Study: Ordered and Reviewed  Medical Tests: Ordered and Reviewed  ED Management:  See Ed course.    S/o Dr. Silverman pending imaging and remainder of labs, dispo.            ED Course as of  06/05/23 1001   Sat May 20, 2023   1859 Pulse(!): 46  Bradycardic [JR]   1933 Beta-Hydroxybutyrate: 0.2 [JR]   2015 Glucose(!): 277  Will give IVF [JR]   2025 Troponin I(!): 0.090  Patient denies chest pain, may be demand, no prior for comparison, will trend.  [JR]   2032 TSH: 3.733 [JR]   2032 WBC(!): 14.84  Mildly elevated, may be reactive vs infection, awaiting CT and UA [JR]   2033 Hemoglobin(!): 9.9  Mild anemia, no prior for comparison [JR]   2033 X-Ray Chest AP Portable  No focal consolidations, patient not coughing or hypoxic, do not suspect pneumonia [JR]   2033 Creatinine: 1.0 [JR]   2034 CO2(!): 20  Getting IV fluids [JR]      ED Course User Index  [JR] Jo Canela MD                 Clinical Impression:   Final diagnoses:  [R00.1] Bradycardia  [R41.82] AMS (altered mental status)        ED Disposition Condition    Observation Stable                Jo Canela MD  06/05/23 1001       Jo Canela MD  06/05/23 1003

## 2023-05-21 NOTE — ASSESSMENT & PLAN NOTE
Elevated bnp noted.  CXR pulmonary edema, interstitial type pneumonia or chronic interstitial lung changes. However, overall pt looks cachectic and has not been eating and drinking well.     - Will give lasix 40 X 1.

## 2023-05-21 NOTE — H&P
[facility]  History & Physical    Patient Name: Tatiana Sparrow  MRN: [id number]  Patient Class: OP- Observation  Admission Date: 5/20/2023  Attending Physician: Gerardo Vail MD   Primary Care Provider: Apple Beavers MD         Patient information was obtained from family/son.     Subjective:     Principal Problem:Altered mental status    Chief Complaint:   Chief Complaint   Patient presents with    Hyperglycemia     Son provides history. CBG 300s, received metformin PTA. No eating/drinking        HPI: 89-year-old with past medical history of T2 DM, hypertension, thyroid disease, presenting for AMS, constipation, hyperglycemia. Over the phone with son, it was difficult to assess pt's baseline status and change in course. Per son, at baseline pt can recognize son and can make minimal conversation. However, over the last few days to weeks, pt has been declining mentally. Her BP and BG was high and was not having BM, and son brought pt in the ER.     At the ED, pt was altered, not alert and oriented; making incomprehensible sounds. Was hypertensive. CBC showed leukocytosis, anemia, and normal plts. CMP showed K 5.3,  EGFR 49.7, glucose 277. . Trop .090->.074. Admitted to hospital medicine for further management.       Past Medical History:   Diagnosis Date    Diabetes mellitus     Hypertension     Thyroid disease        Past Surgical History:   Procedure Laterality Date    CHOLECYSTECTOMY         Review of patient's allergies indicates:  No Known Allergies    No current facility-administered medications on file prior to encounter.     Current Outpatient Medications on File Prior to Encounter   Medication Sig    glipiZIDE (GLUCOTROL) 5 MG tablet Take 5 mg by mouth 2 (two) times daily before meals.    LEVOTHYROXINE SODIUM (LEVOTHYROXINE ORAL) Take by mouth.    metformin (GLUCOPHAGE) 500 MG tablet Take 500 mg by mouth 2 (two) times daily with meals.    NIFEDIPINE (NIFEDICAL  XL ORAL) Take by mouth.     Family History    None       Tobacco Use    Smoking status: Not on file    Smokeless tobacco: Not on file   Substance and Sexual Activity    Alcohol use: Not on file    Drug use: Not on file    Sexual activity: Not on file     Review of Systems   Unable to perform ROS: Mental status change   Objective:     Vital Signs (Most Recent):  Temp: 98.2 °F (36.8 °C) (05/21/23 0744)  Pulse: 96 (05/21/23 0744)  Resp: 14 (05/21/23 0744)  BP: (!) 155/82 (05/21/23 0744)  SpO2: (!) 91 % (05/21/23 0744) Vital Signs (24h Range):  Temp:  [98 °F (36.7 °C)-98.2 °F (36.8 °C)] 98.2 °F (36.8 °C)  Pulse:  [] 96  Resp:  [14-26] 14  SpO2:  [90 %-100 %] 91 %  BP: (131-209)/() 155/82        There is no height or weight on file to calculate BMI.     Physical Exam  Constitutional:       Comments: Altered making incomprehensible sound.   Thin and cachectic.    HENT:      Head: Normocephalic and atraumatic.      Nose: Nose normal.      Mouth/Throat:      Mouth: Mucous membranes are moist.      Pharynx: Oropharynx is clear.   Eyes:      Extraocular Movements: Extraocular movements intact.      Conjunctiva/sclera: Conjunctivae normal.      Pupils: Pupils are equal, round, and reactive to light.   Cardiovascular:      Rate and Rhythm: Normal rate and regular rhythm.      Pulses: Normal pulses.      Heart sounds: Normal heart sounds.      Comments: No JVD appreciated.   Pulmonary:      Effort: Pulmonary effort is normal.      Breath sounds: Normal breath sounds.      Comments: On NC;   Abdominal:      Comments: Abdomen distended   Ventral hernia noted   Hypoactive bowel sound    Musculoskeletal:      Right lower leg: No edema.      Left lower leg: No edema.      Comments: Unable to follow commands    Skin:     General: Skin is warm and dry.   Neurological:      General: No focal deficit present.      Mental Status: She is disoriented.          CRANIAL NERVES     CN III, IV, VI   Pupils are equal, round, and  reactive to light.     Significant Labs: All pertinent labs within the past 24 hours have been reviewed.    Significant Imaging: I have reviewed all pertinent imaging results/findings within the past 24 hours.    Assessment/Plan:     * Altered mental status  89-year-old with past medical history of T2 DM, hypertension, thyroid disease, presenting for AMS, constipation, hyperglycemia. Over the phone with son, it was difficult to assess pt's baseline status. Per son, at baseline pt can recognize son and can make minimal conversation. However, over days to weeks, pt's mental status has declined; now, she is making incomprehensible sound. CT scan of the abdomen showed constipation. BP elevated. . Given pt is 89 years old, discussed with pt's son what should be the goal of treatment. Per son, if we can get bg on control, constipation and bp managed, then that will satisfy. Son did not seem wanted MRI or intensive evaluation for AMS. However, conversation was limited over phone.         DDX of AMS includes: infectious, metabolic, toxin, vascular, drugs, constipation.   - Will treat for constipation, known cause of AMS in elderly.   - Not septic per VS.   - Monitor glucose (see hyperglycemia) and electrolytes.   - Monitor polypharmacy.   - See GOC; reevaluate with son when he come to see pt in person.            Macrocytic anemia  No blood in the stool reported. Macrocytic anemia; Nutritional def part of ddx.     - Sent in iron studies, B12, retic count.       Elevated brain natriuretic peptide (BNP) level  Elevated bnp noted.  CXR pulmonary edema, interstitial type pneumonia or chronic interstitial lung changes. However, overall pt looks cachectic and has not been eating and drinking well.     - Will give lasix 40 X 1.       Adult failure to thrive  - Speech eval   - Monitor electrolytes       Goals of care, counseling/discussion  Advance Care Planning     Pt has been made DNR by the ED physician.     GOC  I  engaged the family in a conversation about advance care planning and we specifically addressed what the goals of care would be moving forward, in light of the patient's change in clinical status, specifically pt's mental status.  We did specifically address the patient's likely prognosis, which is poor.  We explored the patient's values and preferences for future care.  The family endorses that what is most important right now is to focus on symptom/pain control and and controlling hyperglycemia, constipation, and high blood pressure.     Accordingly, we have decided that the best plan to meet the patient's goals includes treatment targeting the above focus and reevaluate periodically.    I spent a total of 15 minutes engaging the patient in this advance care planning discussion.           Hyperkalemia  - Monitor   - Given lasix X 1       Constipation  S/p enema; now having BM   Suppository prn.       Leukocytosis  Monitor for now; VS is not concerning for sepsis at this time.       Hypertensive urgency  Patient has a current diagnosis of hypertensive urgency (without evidence of end organ damage) which is controlled.  Latest blood pressure and vitals reviewed-   Temp:  [98 °F (36.7 °C)-98.2 °F (36.8 °C)]   Pulse:  []   Resp:  [14-26]   BP: (131-209)/()   SpO2:  [90 %-100 %] .   Patient currently off IV antihypertensives.   Home meds for hypertension were reviewed and noted below.   Hypertension Medications             NIFEDIPINE (NIFEDICAL XL ORAL) Take by mouth.          Medication adjustment for hospital antihypertensives is as follows-  Cont home med.     Will aim for controlled BP reduction by medications noted above. Monitor and mitigate end organ damage as indicated.  BP elevation likely triggered from constipation and associated pain. Improved after BM.     Hyperglycemia  Noted to have hyperglycemia. However, pt is not eating well and she is elderly. I worry that starting long acting when pt is  not eating can inadvertently drop glucose;     - Trend glucose q6 and place on low dose sliding scale.         VTE Risk Mitigation (From admission, onward)         Ordered     enoxaparin injection 30 mg  Every 24 hours         05/21/23 0553     IP VTE HIGH RISK PATIENT  Once         05/21/23 0553     Place sequential compression device  Until discontinued         05/21/23 0553                       On 05/21/2023, patient should be placed in hospital observation services under my care in collaboration with Dr. Vail.    Jr Barrett MD  Department of Hospital Medicine  [facility]

## 2023-05-21 NOTE — PT/OT/SLP PROGRESS
Physical Therapy      Patient Name:  Tatiana Sparrow   MRN:  [id number]    Patient not seen today secondary to Other (Comment). Pt does not follow commands and is not oriented to situation.  Speech is nonsensical and no words are formed by the patient.  PT attempted to contact family to determine PLOF however was unable to reach family via phone this day. Will follow-up when appropriate.

## 2023-05-21 NOTE — ED NOTES
Assumed pt care at this time. Pt is laying in bed awake with normal chest rise and fall. Pt son is at bedside.

## 2023-05-21 NOTE — ED NOTES
Made 2 attempts to straight cath pt to obtain a urine specimen but both attempts were unsuccessful. Pt cleaned from small amount of urine and feces then placed back on pure wick. MD notified.

## 2023-05-21 NOTE — ASSESSMENT & PLAN NOTE
Patient has a current diagnosis of hypertensive urgency (without evidence of end organ damage) which is controlled.  Latest blood pressure and vitals reviewed-   Temp:  [98 °F (36.7 °C)-98.2 °F (36.8 °C)]   Pulse:  []   Resp:  [14-26]   BP: (131-209)/()   SpO2:  [90 %-100 %] .   Patient currently off IV antihypertensives.   Home meds for hypertension were reviewed and noted below.   Hypertension Medications             NIFEDIPINE (NIFEDICAL XL ORAL) Take by mouth.          Medication adjustment for hospital antihypertensives is as follows-  Cont home med.     Will aim for controlled BP reduction by medications noted above. Monitor and mitigate end organ damage as indicated.  BP elevation likely triggered from constipation and associated pain. Improved after BM.

## 2023-05-21 NOTE — PROVIDER PROGRESS NOTES - EMERGENCY DEPT.
Patient was signed out to me by Dr. Canela pending UA, CT A/P, CT head. Briefly, this is a 89yo F with decreased oral intake, refusing medications, high blood sugar, AMS.    Data:  Results for orders placed or performed during the hospital encounter of 05/20/23   CBC auto differential   Result Value Ref Range    WBC 14.84 (H) 3.90 - 12.70 K/uL    RBC 2.99 (L) 4.00 - 5.40 M/uL    Hemoglobin 9.9 (L) 12.0 - 16.0 g/dL    Hematocrit 32.0 (L) 37.0 - 48.5 %     (H) 82 - 98 fL    MCH 33.1 (H) 27.0 - 31.0 pg    MCHC 30.9 (L) 32.0 - 36.0 g/dL    RDW 14.2 11.5 - 14.5 %    Platelets 189 150 - 450 K/uL    MPV 11.2 9.2 - 12.9 fL    Immature Granulocytes 0.5 0.0 - 0.5 %    Gran # (ANC) 13.3 (H) 1.8 - 7.7 K/uL    Immature Grans (Abs) 0.08 (H) 0.00 - 0.04 K/uL    Lymph # 0.6 (L) 1.0 - 4.8 K/uL    Mono # 0.8 0.3 - 1.0 K/uL    Eos # 0.0 0.0 - 0.5 K/uL    Baso # 0.01 0.00 - 0.20 K/uL    nRBC 0 0 /100 WBC    Gran % 89.5 (H) 38.0 - 73.0 %    Lymph % 4.2 (L) 18.0 - 48.0 %    Mono % 5.7 4.0 - 15.0 %    Eosinophil % 0.0 0.0 - 8.0 %    Basophil % 0.1 0.0 - 1.9 %    Differential Method Automated    Comprehensive metabolic panel   Result Value Ref Range    Sodium 139 136 - 145 mmol/L    Potassium 5.3 (H) 3.5 - 5.1 mmol/L    Chloride 105 95 - 110 mmol/L    CO2 20 (L) 23 - 29 mmol/L    Glucose 277 (H) 70 - 110 mg/dL    BUN 48 (H) 10 - 30 mg/dL    Creatinine 1.0 0.5 - 1.4 mg/dL    Calcium 9.4 8.7 - 10.5 mg/dL    Total Protein 7.1 6.0 - 8.4 g/dL    Albumin 3.2 (L) 3.5 - 5.2 g/dL    Total Bilirubin 0.6 0.1 - 1.0 mg/dL    Alkaline Phosphatase 72 55 - 135 U/L    AST 45 (H) 10 - 40 U/L    ALT 36 10 - 44 U/L    Anion Gap 14 8 - 16 mmol/L    eGFR 49.7 (A) >60 mL/min/1.73 m^2   Beta - Hydroxybutyrate, Serum   Result Value Ref Range    Beta-Hydroxybutyrate 0.2 0.0 - 0.5 mmol/L   Magnesium   Result Value Ref Range    Magnesium 2.3 1.6 - 2.6 mg/dL   Troponin I   Result Value Ref Range    Troponin I 0.090 (H) 0.000 - 0.026 ng/mL   TSH   Result  Value Ref Range    TSH 3.733 0.400 - 4.000 uIU/mL   Troponin I   Result Value Ref Range    Troponin I 0.074 (H) 0.000 - 0.026 ng/mL   Brain natriuretic peptide   Result Value Ref Range     (H) 0 - 99 pg/mL   POCT glucose   Result Value Ref Range    POCT Glucose 269 (H) 70 - 110 mg/dL   POCT glucose   Result Value Ref Range    POCT Glucose 289 (H) 70 - 110 mg/dL      Imaging Results               CT Abdomen Pelvis With Contrast (Final result)  Result time 05/20/23 23:35:47      Final result by Lee Ellis MD (05/20/23 23:35:47)                   Impression:      1. Prominent fecal distention of the rectum with retained feces in the colon also.  Rectal impaction is a possibility.  See above comments.  Follow-up recommended.  2. Moderate hiatal hernia.  3. Nondistention of the stomach with possible wall thickening/gastritis.  4.  This report was flagged in Epic as abnormal.      Electronically signed by: Lee Ellis  Date:    05/20/2023  Time:    23:35               Narrative:    EXAMINATION:  CT ABDOMEN PELVIS WITH CONTRAST    CLINICAL HISTORY:  Abdominal pain, acute, nonlocalized;    TECHNIQUE:  Low dose axial images, sagittal and coronal reformations were obtained from the lung bases to the pubic symphysis following the IV administration of 75 mL of Omnipaque 350 .  Oral contrast was not administered.    COMPARISON:  None.    FINDINGS:  The patient is rotated to the right obscuring visualization.    Abdomen:    - Lower thorax:Moderate hiatal hernia.    - Lung bases: Calcified granuloma at the right lung base with mild associated atelectasis.    - Liver: No focal mass.    - Gallbladder: Gallbladder is not identified.    - Bile Ducts: No evidence of intra or extra hepatic biliary ductal dilation.    - Spleen: Negative.    - Kidneys: Extrarenal pelvis configuration bilaterally.  No stone, soft tissue mass or hydronephrosis.  Small probable cyst on the right.    - Adrenals: Unremarkable.    - Pancreas: No  mass or peripancreatic fat stranding.    - Retroperitoneum:  No significant adenopathy.    - Vascular: No abdominal aortic aneurysm.    - Abdominal wall:  Unremarkable.    Pelvis:    Urinary bladder is adequately maintained.    The uterus is not well visualized may be small removed.    Bowel/Mesentery:    There is prominent fecal distention of the rectum.  There is moderate retained feces and gas in the colon also.  Rectal impaction is a possibility.    The stomach is nondistended.  Mild wall thickening/gastritis is a possibility.  Mild adjacent stranding.    Bones:  No acute osseous abnormality and no suspicious lytic or blastic lesion.    Edema in the subcutaneous tissues.                                       CT Head Without Contrast (Final result)  Result time 05/20/23 23:23:47      Final result by Lee Ellis MD (05/20/23 23:23:47)                   Impression:      1. No acute intracranial process.  2. Involutional changes with chronic microvascular ischemic changes.      Electronically signed by: Lee Ellis  Date:    05/20/2023  Time:    23:23               Narrative:    EXAMINATION:  CT HEAD WITHOUT CONTRAST    CLINICAL HISTORY:  Mental status change, unknown cause;    TECHNIQUE:  Low dose axial images were obtained through the head.  coronal and sagittal reformations were also performed. Contrast was not administered.    COMPARISON:  None.    FINDINGS:  The patient is rotated to the right obscuring visualization.    No midline shift or acute hydrocephalus.    Moderate-severe involutional changes with chronic microvascular ischemic changes in the periventricular white matter.    No evidence of any focal mass or hemorrhage.  No calvarial fractures.    Paranasal sinuses and mastoid air cells are clear.                                       X-Ray Chest AP Portable (Final result)  Result time 05/20/23 19:42:45      Final result by Carlos Resendiz MD (05/20/23 19:42:45)                   Impression:       Bibasilar mild nonspecific interstitial coarsening with differential considerations above.  No consolidative process.    Suspected several bilateral small pulmonary nodules.  Further evaluation with elective/nonemergent CT thorax can be obtained if 2 year stability cannot be confirmed.      Electronically signed by: Carlos Resendiz MD  Date:    05/20/2023  Time:    19:42               Narrative:    EXAMINATION:  XR CHEST AP PORTABLE    CLINICAL HISTORY:  hyperglycemia;    TECHNIQUE:  Single frontal view of the chest was performed.    COMPARISON:  None    FINDINGS:  Monitoring leads overlie the chest.  Patient is slightly rotated.    Mediastinal structures are midline.  There is calcification and tortuosity of the aorta.  Cardiac silhouette is mildly enlarged without convincing evidence of failure.  Pulmonary vasculature and hilar contours are within normal limits.  There is nonspecific elevation of the right hemidiaphragm.    Skin folds overlie the left chest.  There is bibasilar platelike scarring versus atelectasis.  There is also bibasilar mild nonspecific interstitial coarsening which can be seen with pulmonary edema, interstitial type pneumonia or chronic interstitial lung changes.  Few scattered small nodules noted throughout each lung.  No large consolidation, pleural effusion or pneumothorax.    No acute osseous process seen.  1.3 cm calcific density projects over the medial lower right neck near the thoracic inlet which may reflect large calcified thyroid nodule versus calcified lymph node.  PA and lateral views can be obtained.                                       MDM:   89-year-old female presenting to the emergency department with decreased oral intake, not taking medications, failure to thrive in an altered mental status.  CT head negative for ICH or other acute abnormality.  CT abdomen pelvis shows constipation, enema ordered, no signs of acute intra-abdominal infection.  Blood pressure elevated  likely secondary to medication noncompliance.  Oral nifedipine ordered.  Doubt hypertensive emergency.  UA still pending.  Plan for admission to hospital medicine.    Per son, patient is DNR/DNI.

## 2023-05-21 NOTE — HPI
89-year-old with past medical history of T2 DM, hypertension, thyroid disease, presenting for AMS, constipation, hyperglycemia. Over the phone with son, it was difficult to assess pt's baseline status and change in course. Per son, at baseline pt can recognize son and can make minimal conversation. However, over the last few days to weeks, pt has been declining mentally. Her BP and BG was high and was not having BM, and son brought pt in the ER.     At the ED, pt was altered, not alert and oriented; making incomprehensible sounds. Was hypertensive. CBC showed leukocytosis, anemia, and normal plts. CMP showed K 5.3,  EGFR 49.7, glucose 277. . Trop .090->.074. Admitted to hospital medicine for further management.

## 2023-05-22 PROBLEM — I50.22 CHRONIC HFREF (HEART FAILURE WITH REDUCED EJECTION FRACTION): Status: ACTIVE | Noted: 2023-01-01

## 2023-05-22 PROBLEM — I36.1 NONRHEUMATIC TRICUSPID VALVE REGURGITATION: Status: ACTIVE | Noted: 2023-01-01

## 2023-05-22 PROBLEM — N17.9 AKI (ACUTE KIDNEY INJURY): Status: ACTIVE | Noted: 2023-01-01

## 2023-05-22 PROBLEM — R78.81 GRAM-NEGATIVE BACTEREMIA: Status: ACTIVE | Noted: 2023-01-01

## 2023-05-22 PROBLEM — N30.01 ACUTE CYSTITIS WITH HEMATURIA: Status: ACTIVE | Noted: 2023-01-01

## 2023-05-22 NOTE — PT/OT/SLP PROGRESS
"Speech Language Pathology Treatment    Patient Name:  Tatiana Sparrow   MRN:  [id number]  Admitting Diagnosis: Severe sepsis    Recommendations:                 General Recommendations:  Dysphagia therapy  Diet recommendations:  NPO, Liquid Diet Level: NPO   Aspiration Precautions: Strict aspiration precautions   General Precautions: Standard, aspiration      Assessment:     Tatiana Sparrow is a 89 y.o. female with an SLP diagnosis of Dysphagia.      Subjective     Awake/alert      Pain/Comfort:  Pain Rating 1: 0/10  Pain Rating Post-Intervention 1: 0/10    Respiratory Status: Room air    Objective:     Has the patient been evaluated by SLP for swallowing?   Yes  Keep patient NPO? Yes     Pt repositioned upright in bed and asked "do you want to eat" prior to PO trials being presented. Audible rattle noted during respiration. Pt did not follow commands when prompted. SLP attempted to present PO (water, puree, and cracker) to pt with no attempt to accept. Pt pushed hand away when trials presented. Continue NPO diet at this time.     Goals:   Multidisciplinary Problems       SLP Goals          Problem: SLP    Goal Priority Disciplines Outcome   SLP Goal     SLP Ongoing, Progressing   Description: Speech Language Pathology Goals  Goals expected to be met by 5/28    1. Pt will participate in ongoing swallow assessment                                Plan:     Patient to be seen:  3 x/week   Plan of Care reviewed with:  patient   SLP Follow-Up:  Yes          Time Tracking:     SLP Treatment Date:   05/22/23  Speech Start Time:  1012  Speech Stop Time:  1017     Speech Total Time (min):  5 min    Billable Minutes: Treatment Swallowing Dysfunction 5    05/22/2023  "

## 2023-05-22 NOTE — PROGRESS NOTES
[facility] Medicine  Progress Note    Patient Name: Tatiana Sparrow  MRN: [id number]  Patient Class: IP- Inpatient   Admission Date: 5/20/2023  Length of Stay: 0 days  Attending Physician: Gerardo Vail MD  Primary Care Provider: Apple Beavers MD        Subjective:     Principal Problem:Severe sepsis        HPI:  89-year-old with past medical history of T2 DM, hypertension, thyroid disease, presenting for AMS, constipation, hyperglycemia. Over the phone with son, it was difficult to assess pt's baseline status and change in course. Per son, at baseline pt can recognize son and can make minimal conversation. However, over the last few days to weeks, pt has been declining mentally. Her BP and BG was high and was not having BM, and son brought pt in the ER.     At the ED, pt was altered, not alert and oriented; making incomprehensible sounds. Was hypertensive. CBC showed leukocytosis, anemia, and normal plts. CMP showed K 5.3,  EGFR 49.7, glucose 277. . Trop .090->.074. Admitted to hospital medicine for further management.       Overview/Hospital Course:  Admitted for management of altered mental status.  Bcx growing GNR, broadened ceftriaxone to zosyn.  UA with UTI.  SLP consulted, recommending NPO.        Interval History: had small bowel movment yesterday, this am, patient opening eyes to voice however not verbalizing.  Abdomen still distended, tender.  WBC increasing, Cr worsening.  Bcx with GNR, broadening to zosyn.     Review of Systems   Unable to perform ROS: Mental status change   Objective:     Vital Signs (Most Recent):  Temp: 98.8 °F (37.1 °C) (05/22/23 1322)  Pulse: 101 (05/22/23 1322)  Resp: 16 (05/22/23 1322)  BP: 138/63 (05/22/23 1322)  SpO2: 97 % (05/22/23 1322) Vital Signs (24h Range):  Temp:  [97 °F (36.1 °C)-98.9 °F (37.2 °C)] 98.8 °F (37.1 °C)  Pulse:  [] 101  Resp:  [16-22] 16  SpO2:  [83 %-98 %] 97 %  BP: (115-146)/(51-71) 138/63     Weight: 48.1 kg (106  lb)  Body mass index is 19.39 kg/m².    Intake/Output Summary (Last 24 hours) at 5/22/2023 1337  Last data filed at 5/22/2023 0500  Gross per 24 hour   Intake --   Output 100 ml   Net -100 ml         Physical Exam  Constitutional:       Comments: Altered making incomprehensible sound.   Thin and cachectic.    HENT:      Head: Normocephalic and atraumatic.      Nose: Nose normal.      Mouth/Throat:      Mouth: Mucous membranes are moist.      Pharynx: Oropharynx is clear.   Eyes:      Extraocular Movements: Extraocular movements intact.      Conjunctiva/sclera: Conjunctivae normal.      Pupils: Pupils are equal, round, and reactive to light.   Cardiovascular:      Rate and Rhythm: Normal rate and regular rhythm.      Pulses: Normal pulses.      Heart sounds: Normal heart sounds.      Comments: No JVD appreciated.   Pulmonary:      Effort: Pulmonary effort is normal.      Breath sounds: Normal breath sounds.      Comments: On NC;   Abdominal:      Comments: Abdomen distended   Ventral hernia noted   Hypoactive bowel sound    Musculoskeletal:      Right lower leg: No edema.      Left lower leg: No edema.      Comments: Unable to follow commands    Skin:     General: Skin is warm and dry.   Neurological:      General: No focal deficit present.      Mental Status: She is disoriented.           Significant Labs: All pertinent labs within the past 24 hours have been reviewed.    Significant Imaging: I have reviewed all pertinent imaging results/findings within the past 24 hours.      Assessment/Plan:      * Severe sepsis  This patient does have evidence of infective focus  My overall impression is sepsis.  Source: Respiratory, Urinary Tract and Abdominal  Antibiotics given-   Antibiotics (72h ago, onward)    Start     Stop Route Frequency Ordered    05/22/23 2300  piperacillin-tazobactam (ZOSYN) 4.5 g in dextrose 5 % in water (D5W) 5 % 100 mL IVPB (MB+)         -- IV Every 12 hours (non-standard times) 05/22/23 4773     05/21/23 1300  azithromycin (ZITHROMAX) 500 mg in dextrose 5 % (D5W) 250 mL IVPB (Vial-Mate)         05/24 1259 IV Every 24 hours (non-standard times) 05/21/23 1204        Latest lactate reviewed-  Recent Labs   Lab 05/22/23  0911   LACTATE 1.9     Organ dysfunction indicated by Acute kidney injury, Acute respiratory failure and Encephalopathy    Fluid challenge Not needed - patient is not hypotensive      Post- resuscitation assessment No - Post resuscitation assessment not needed       Will Not start Pressors  Source control achieved by: zosyn, azithro  - f/u bcx speciation/sensitivity    ALEJANDRA (acute kidney injury)    Lab Results   Component Value Date    BUN 60 (H) 05/22/2023    CREATININE 1.5 (H) 05/22/2023     - strict I/O  - CMP qd, trend Cr  - urine lytes pending  - U/s retroperitoneal  - renally dose all medications  - avoid nephrotoxic agents, NSAIDs, IV contrast, ACE/ARB  - Maintain MAP > 65        Chronic HFrEF (heart failure with reduced ejection fraction)    Lab Results   Component Value Date    TROPONINI 0.074 (H) 05/20/2023     (H) 05/20/2023       Results for orders placed during the hospital encounter of 05/20/23    Echo    Interpretation Summary  · The left ventricle is normal in size with concentric remodeling and mildly decreased systolic function.  · The estimated ejection fraction is 45%.  · There is abnormal septal wall motion.  · There are segmental left ventricular wall motion abnormalities.  · Left ventricular diastolic dysfunction.  · Moderate left atrial enlargement.  · Normal right ventricular size with low normal right ventricular systolic function.  · Mild right atrial enlargement.  · There is mild aortic valve stenosis and AR.  · Aortic valve area is 1.62 cm2; peak velocity is 1.85 m/s; mean gradient is 9 mmHg.  · Mild mitral regurgitation.  · Moderate to severe tricuspid regurgitation.    - compensated on exam  - if IVF required, judicious use given low EF  - Maintain on  telemetry and daily EKGs  - no home gdmt     Gram-negative bacteremia  BCx on admit with GNR  - f/u speciation/sensitivity  - continue zosyn, deescalate as tolerated      Acute cystitis with hematuria  - UA with UTI, on zosyn    Dementia without behavioral disturbance, psychotic disturbance, mood disturbance, or anxiety  Per son, Patient lives at home with him, is somewhat interactive with him, and able to feed herself at baseline    Acute hypoxemic respiratory failure  Patient with Hypoxic Respiratory failure which is Acute.  she is not on home oxygen. Supplemental oxygen was provided and noted-      .   Signs/symptoms of respiratory failure include- tachypnea. Contributing diagnoses includes - Pneumonia Labs and images were reviewed. Patient Has not had a recent ABG. Will treat underlying causes and adjust management of respiratory failure as follows- abx    Macrocytic anemia  No blood in the stool reported. Macrocytic anemia; Nutritional def part of ddx.  - trend    Elevated brain natriuretic peptide (BNP) level  Elevated bnp noted.  CXR pulmonary edema, interstitial type pneumonia or chronic interstitial lung changes. However, overall pt looks cachectic and has not been eating and drinking well.     - given lasix 40 X 1.   - hold diuresis for now given ALEJANDRA    Adult failure to thrive  - Speech eval, NPO for now  - Monitor electrolytes       Goals of care, counseling/discussion  Advance Care Planning     Pt has been made DNR by the ED physician.     [facility]  I engaged the family in a conversation about advance care planning and we specifically addressed what the goals of care would be moving forward, in light of the patient's change in clinical status, specifically pt's mental status.  We did specifically address the patient's likely prognosis, which is poor.  We explored the patient's values and preferences for future care.  The family endorses that what is most important right now is to focus on symptom/pain control  and and controlling hyperglycemia, constipation, and high blood pressure.     Accordingly, we have decided that the best plan to meet the patient's goals includes treatment targeting the above focus and reevaluate periodically.    I spent a total of 15 minutes engaging the patient in this advance care planning discussion.           Hyperkalemia  - Monitor   - Given lasix X 1       Constipation  S/p enema; now having BM   - CT with large stool burden  - enema ordered      Leukocytosis  - trend    Hypertensive urgency  Patient has a current diagnosis of hypertensive urgency (without evidence of end organ damage) which is controlled.  Latest blood pressure and vitals reviewed-   Temp:  [97 °F (36.1 °C)-98.9 °F (37.2 °C)]   Pulse:  []   Resp:  [16-22]   BP: (115-146)/(51-71)   SpO2:  [83 %-98 %] .   Patient currently off IV antihypertensives.   Home meds for hypertension were reviewed and noted below.   Hypertension Medications             NIFEDIPINE (NIFEDICAL XL ORAL) Take by mouth.          Medication adjustment for hospital antihypertensives is as follows-  Cont home med.     Will aim for controlled BP reduction by medications noted above. Monitor and mitigate end organ damage as indicated.  BP elevation likely triggered from constipation and associated pain. Improved after BM.     Hyperglycemia  Noted to have hyperglycemia. However, pt is not eating well and she is elderly. I worry that starting long acting when pt is not eating can inadvertently drop glucose;     - Trend glucose q6 and place on low dose sliding scale.       Acute metabolic encephalopathy  89-year-old with past medical history of T2 DM, hypertension, thyroid disease, presenting for AMS, constipation, hyperglycemia. Over the phone with son, it was difficult to assess pt's baseline status. Per son, at baseline pt can recognize son and can make minimal conversation. However, over days to weeks, pt's mental status has declined; now, she is  making incomprehensible sound. CT scan of the abdomen showed constipation. BP elevated. . Given pt is 89 years old, discussed with pt's son what should be the goal of treatment. Per son, if we can get bg on control, constipation and bp managed, then that will satisfy. Son did not seem wanted MRI or intensive evaluation for AMS. However, conversation was limited over phone.         DDX of AMS includes: infectious, metabolic, toxin, vascular, drugs, constipation.   - now with GNR bacteremia, continue abx  - Will treat for constipation, known cause of AMS in elderly.   - Monitor glucose (see hyperglycemia) and electrolytes.   - Monitor polypharmacy.   - See GOC, DNR      VTE Risk Mitigation (From admission, onward)         Ordered     heparin (porcine) injection 5,000 Units  Every 12 hours         05/22/23 1123     IP VTE HIGH RISK PATIENT  Once         05/21/23 0553     Place sequential compression device  Until discontinued         05/21/23 0553                Discharge Planning   NETO: 5/24/2023     Code Status: DNR   Is the patient medically ready for discharge?: No    Reason for patient still in hospital (select all that apply): Treatment                     Jonel Sorensen MD  Department of Hospital Medicine   [facility]

## 2023-05-22 NOTE — ASSESSMENT & PLAN NOTE
Patient has a current diagnosis of hypertensive urgency (without evidence of end organ damage) which is controlled.  Latest blood pressure and vitals reviewed-   Temp:  [97 °F (36.1 °C)-98.9 °F (37.2 °C)]   Pulse:  []   Resp:  [16-22]   BP: (115-146)/(51-71)   SpO2:  [83 %-98 %] .   Patient currently off IV antihypertensives.   Home meds for hypertension were reviewed and noted below.   Hypertension Medications             NIFEDIPINE (NIFEDICAL XL ORAL) Take by mouth.          Medication adjustment for hospital antihypertensives is as follows-  Cont home med.     Will aim for controlled BP reduction by medications noted above. Monitor and mitigate end organ damage as indicated.  BP elevation likely triggered from constipation and associated pain. Improved after BM.

## 2023-05-22 NOTE — ASSESSMENT & PLAN NOTE
89-year-old with past medical history of T2 DM, hypertension, thyroid disease, presenting for AMS, constipation, hyperglycemia. Over the phone with son, it was difficult to assess pt's baseline status. Per son, at baseline pt can recognize son and can make minimal conversation. However, over days to weeks, pt's mental status has declined; now, she is making incomprehensible sound. CT scan of the abdomen showed constipation. BP elevated. . Given pt is 89 years old, discussed with pt's son what should be the goal of treatment. Per son, if we can get bg on control, constipation and bp managed, then that will satisfy. Son did not seem wanted MRI or intensive evaluation for AMS. However, conversation was limited over phone.         DDX of AMS includes: infectious, metabolic, toxin, vascular, drugs, constipation.   - now with GNR bacteremia, continue abx  - Will treat for constipation, known cause of AMS in elderly.   - Monitor glucose (see hyperglycemia) and electrolytes.   - Monitor polypharmacy.   - See GOC, DNR

## 2023-05-22 NOTE — ASSESSMENT & PLAN NOTE
Advance Care Planning     Pt has been made DNR by the ED physician.     [facility]  I engaged the family in a conversation about advance care planning and we specifically addressed what the goals of care would be moving forward, in light of the patient's change in clinical status, specifically pt's mental status.  We did specifically address the patient's likely prognosis, which is poor.  We explored the patient's values and preferences for future care.  The family endorses that what is most important right now is to focus on symptom/pain control and and controlling hyperglycemia, constipation, and high blood pressure.     Accordingly, we have decided that the best plan to meet the patient's goals includes treatment targeting the above focus and reevaluate periodically.    I spent a total of 15 minutes engaging the patient in this advance care planning discussion.

## 2023-05-22 NOTE — PLAN OF CARE
POC reviewed with pt. AAO.  Pt remained free from falls. Questions and concerns addressed. EKG, echo, retro US. IV abx administered.  Pt progressing towards goals. Will continue to monitor. See flow sheets for full assessment and VS

## 2023-05-22 NOTE — ASSESSMENT & PLAN NOTE
This patient does have evidence of infective focus  My overall impression is sepsis.  Source: Respiratory, Urinary Tract and Abdominal  Antibiotics given-   Antibiotics (72h ago, onward)    Start     Stop Route Frequency Ordered    05/22/23 2300  piperacillin-tazobactam (ZOSYN) 4.5 g in dextrose 5 % in water (D5W) 5 % 100 mL IVPB (MB+)         -- IV Every 12 hours (non-standard times) 05/22/23 1129    05/21/23 1300  azithromycin (ZITHROMAX) 500 mg in dextrose 5 % (D5W) 250 mL IVPB (Vial-Mate)         05/24 1259 IV Every 24 hours (non-standard times) 05/21/23 1204        Latest lactate reviewed-  Recent Labs   Lab 05/22/23  0911   LACTATE 1.9     Organ dysfunction indicated by Acute kidney injury, Acute respiratory failure and Encephalopathy    Fluid challenge Not needed - patient is not hypotensive      Post- resuscitation assessment No - Post resuscitation assessment not needed       Will Not start Pressors  Source control achieved by: zosyn, azithro  - f/u bcx speciation/sensitivity

## 2023-05-22 NOTE — PT/OT/SLP PROGRESS
Physical Therapy      Patient Name:  Tatiana Sparrow   MRN:  [id number]    Patient not seen today secondary to: pt non-verbal and unable to be engaged to perform evaluation. Will follow-up when medically appropriate.    5/22/2023

## 2023-05-22 NOTE — SUBJECTIVE & OBJECTIVE
Interval History: had small bowel movment yesterday, this am, patient opening eyes to voice however not verbalizing.  Abdomen still distended, tender.  WBC increasing, Cr worsening.  Bcx with GNR, broadening to zosyn.     Review of Systems   Unable to perform ROS: Mental status change   Objective:     Vital Signs (Most Recent):  Temp: 98.8 °F (37.1 °C) (05/22/23 1322)  Pulse: 101 (05/22/23 1322)  Resp: 16 (05/22/23 1322)  BP: 138/63 (05/22/23 1322)  SpO2: 97 % (05/22/23 1322) Vital Signs (24h Range):  Temp:  [97 °F (36.1 °C)-98.9 °F (37.2 °C)] 98.8 °F (37.1 °C)  Pulse:  [] 101  Resp:  [16-22] 16  SpO2:  [83 %-98 %] 97 %  BP: (115-146)/(51-71) 138/63     Weight: 48.1 kg (106 lb)  Body mass index is 19.39 kg/m².    Intake/Output Summary (Last 24 hours) at 5/22/2023 1337  Last data filed at 5/22/2023 0500  Gross per 24 hour   Intake --   Output 100 ml   Net -100 ml         Physical Exam  Constitutional:       Comments: Altered making incomprehensible sound.   Thin and cachectic.    HENT:      Head: Normocephalic and atraumatic.      Nose: Nose normal.      Mouth/Throat:      Mouth: Mucous membranes are moist.      Pharynx: Oropharynx is clear.   Eyes:      Extraocular Movements: Extraocular movements intact.      Conjunctiva/sclera: Conjunctivae normal.      Pupils: Pupils are equal, round, and reactive to light.   Cardiovascular:      Rate and Rhythm: Normal rate and regular rhythm.      Pulses: Normal pulses.      Heart sounds: Normal heart sounds.      Comments: No JVD appreciated.   Pulmonary:      Effort: Pulmonary effort is normal.      Breath sounds: Normal breath sounds.      Comments: On NC;   Abdominal:      Comments: Abdomen distended   Ventral hernia noted   Hypoactive bowel sound    Musculoskeletal:      Right lower leg: No edema.      Left lower leg: No edema.      Comments: Unable to follow commands    Skin:     General: Skin is warm and dry.   Neurological:      General: No focal deficit present.       Mental Status: She is disoriented.           Significant Labs: All pertinent labs within the past 24 hours have been reviewed.    Significant Imaging: I have reviewed all pertinent imaging results/findings within the past 24 hours.

## 2023-05-22 NOTE — ASSESSMENT & PLAN NOTE
Patient with Hypoxic Respiratory failure which is Acute.  she is not on home oxygen. Supplemental oxygen was provided and noted-      .   Signs/symptoms of respiratory failure include- tachypnea. Contributing diagnoses includes - Pneumonia Labs and images were reviewed. Patient Has not had a recent ABG. Will treat underlying causes and adjust management of respiratory failure as follows- abx

## 2023-05-22 NOTE — ASSESSMENT & PLAN NOTE
Lab Results   Component Value Date    TROPONINI 0.074 (H) 05/20/2023     (H) 05/20/2023       Results for orders placed during the hospital encounter of 05/20/23    Echo    Interpretation Summary  · The left ventricle is normal in size with concentric remodeling and mildly decreased systolic function.  · The estimated ejection fraction is 45%.  · There is abnormal septal wall motion.  · There are segmental left ventricular wall motion abnormalities.  · Left ventricular diastolic dysfunction.  · Moderate left atrial enlargement.  · Normal right ventricular size with low normal right ventricular systolic function.  · Mild right atrial enlargement.  · There is mild aortic valve stenosis and AR.  · Aortic valve area is 1.62 cm2; peak velocity is 1.85 m/s; mean gradient is 9 mmHg.  · Mild mitral regurgitation.  · Moderate to severe tricuspid regurgitation.    - compensated on exam  - if IVF required, judicious use given low EF  - Maintain on telemetry and daily EKGs  - no home gdmt

## 2023-05-22 NOTE — HOSPITAL COURSE
Admitted for management of altered mental status. UA with UTI.  Both urine and blood cultures growing GNR. Broadened to Zosyn. AXR shows impaction. SLP consulted, recommending NPO. Cardiac function is markedly reduced on  echo, and patient has developed renal and respiratory failure. Have discussed GOC w/family, and will focus on comfort care if patient's condition continues to worsen. Unfortunately, leukocytosis continues to trend upwards. Patient w/large stool burden and impaction seen on CT abdomen and abdominal xray. Disimpacted on . Repeat echo on  demonstrated precipitous drop in ejection fraction to 15%, as well as now eccentric hypertrophy: troponin markedly elevated. Suspected NSTEMI in setting of sepsis and started ACS protocol following discussions with family. Patient exhibited progressive renal failure w/anion gap metabolic acidosis and hyperkalemia resistant to shifting. Discussed with family that prognosis is poor, and all in agreement that given her age and overall condition, any invasive treatment may do more harm than good. Patient  on PM.

## 2023-05-22 NOTE — PROGRESS NOTES
Pharmacist Renal Dose Adjustment Note    Tatiana Sparrow is a 89 y.o. female being treated with the medication famotidine    Patient Data:    Vital Signs (Most Recent):  Temp: 98.7 °F (37.1 °C) (05/22/23 0730)  Pulse: 96 (05/22/23 0730)  Resp: (!) 22 (05/22/23 0730)  BP: (!) 146/65 (05/22/23 0730)  SpO2: (!) 92 % (05/22/23 0730) Vital Signs (72h Range):  Temp:  [97 °F (36.1 °C)-100.2 °F (37.9 °C)]   Pulse:  []   Resp:  [14-26]   BP: (115-209)/()   SpO2:  [83 %-100 %]      Recent Labs   Lab 05/20/23  1914 05/21/23  0928 05/22/23  0652   CREATININE 1.0 0.9 1.5*     Serum creatinine: 1.5 mg/dL (H) 05/22/23 0652  Estimated creatinine clearance: 14.4 mL/min (A)    Medication:famotidine dose: 20 mg frequency BID will be changed to medication:famotidine dose:20 mg frequency:daily    Pharmacist's Name: Gracia Couch  Pharmacist's Extension: secure chat

## 2023-05-22 NOTE — ASSESSMENT & PLAN NOTE
Elevated bnp noted.  CXR pulmonary edema, interstitial type pneumonia or chronic interstitial lung changes. However, overall pt looks cachectic and has not been eating and drinking well.     - given lasix 40 X 1.   - hold diuresis for now given ALEJANDRA

## 2023-05-22 NOTE — ASSESSMENT & PLAN NOTE
Per son, Patient lives at home with him, is somewhat interactive with him, and able to feed herself at baseline

## 2023-05-22 NOTE — PROGRESS NOTES
Pharmacist Renal Dose Adjustment Note    Tatiana Sparrow is a 89 y.o. female being treated with the medication zosyn    Patient Data:    Vital Signs (Most Recent):  Temp: 98.7 °F (37.1 °C) (05/22/23 0730)  Pulse: 96 (05/22/23 0730)  Resp: (!) 22 (05/22/23 0730)  BP: (!) 146/65 (05/22/23 0730)  SpO2: (!) 92 % (05/22/23 0730) Vital Signs (72h Range):  Temp:  [97 °F (36.1 °C)-100.2 °F (37.9 °C)]   Pulse:  []   Resp:  [14-26]   BP: (115-209)/()   SpO2:  [83 %-100 %]      Recent Labs   Lab 05/20/23  1914 05/21/23  0928 05/22/23  0652   CREATININE 1.0 0.9 1.5*     Serum creatinine: 1.5 mg/dL (H) 05/22/23 0652  Estimated creatinine clearance: 14.4 mL/min (A)    Medication:zosyn dose: 4.5 grams frequency every 8 hours will be changed to medication:zosyn dose:4.5 grams frequency:every 12 hours    Pharmacist's Name: Gracia Couch  Pharmacist's Extension: secure chat

## 2023-05-22 NOTE — ASSESSMENT & PLAN NOTE
Lab Results   Component Value Date    BUN 60 (H) 05/22/2023    CREATININE 1.5 (H) 05/22/2023     - strict I/O  - CMP qd, trend Cr  - urine lytes pending  - U/s retroperitoneal  - renally dose all medications  - avoid nephrotoxic agents, NSAIDs, IV contrast, ACE/ARB  - Maintain MAP > 65

## 2023-05-23 PROBLEM — N18.9 ACUTE KIDNEY INJURY SUPERIMPOSED ON CHRONIC KIDNEY DISEASE: Status: ACTIVE | Noted: 2023-01-01

## 2023-05-23 PROBLEM — K56.41 FECAL IMPACTION: Status: ACTIVE | Noted: 2023-01-01

## 2023-05-23 NOTE — PT/OT/SLP PROGRESS
Physical Therapy      Patient Name:  Tatiana Sparrow   MRN:  [id number]    Family at bedside (son, daughter-in-law, and other members). Pt is still non-verbal at this point. Per family, pt is dependent at baseline, requiring assist for all transfers and w/c bound. Family propels her. Son states that he performs LE ROM on pt's legs d/t contractures. Discussion with son to continue PROM. No additional PT needs at this time as pt is at functional baseline.    5/23/2023    [id number] in room with family (no units billed)

## 2023-05-23 NOTE — SUBJECTIVE & OBJECTIVE
Interval History: NAEON; AXR w/impaction; mineral oil enema today, and if ineffective, disimpaction; continuing Zosyn; WBC continues to climb, waiting for GNR ID/sensitivity and will consult ID. ALEJANDRA worsening, w/potassium trending up; giving ivf, will monitor respiratory status closely. Patient is alert but does not communicate clearly. Echo w/significantly worsened EF and now eccentric hypertrophy. Discussed GOC with family, and will continue antibiotics for now, but if her condition worsens, especially acutely, will pursue comfort-focused care.    Review of Systems   Unable to perform ROS: Mental status change   Objective:     Vital Signs (Most Recent):  Temp: 97.8 °F (36.6 °C) (05/23/23 1054)  Pulse: 101 (05/23/23 1054)  Resp: 18 (05/23/23 1054)  BP: (!) 146/76 (05/23/23 1054)  SpO2: 98 % (05/23/23 1054) Vital Signs (24h Range):  Temp:  [96.5 °F (35.8 °C)-98.8 °F (37.1 °C)] 97.8 °F (36.6 °C)  Pulse:  [] 101  Resp:  [16-28] 18  SpO2:  [92 %-100 %] 98 %  BP: (119-178)/(63-96) 146/76     Weight: 48.1 kg (106 lb)  Body mass index is 19.39 kg/m².    Intake/Output Summary (Last 24 hours) at 5/23/2023 1224  Last data filed at 5/22/2023 1523  Gross per 24 hour   Intake --   Output 250 ml   Net -250 ml           Physical Exam  Constitutional:       Comments: Altered making incomprehensible sound.   Thin and cachectic.    HENT:      Head: Normocephalic and atraumatic.      Nose: Nose normal.      Mouth/Throat:      Mouth: Mucous membranes are moist.      Pharynx: Oropharynx is clear.   Eyes:      Extraocular Movements: Extraocular movements intact.      Conjunctiva/sclera: Conjunctivae normal.      Pupils: Pupils are equal, round, and reactive to light.   Cardiovascular:      Rate and Rhythm: Normal rate and regular rhythm.      Pulses: Normal pulses.      Heart sounds: Normal heart sounds.      Comments: No JVD appreciated.   Pulmonary:      Effort: Pulmonary effort is normal.      Breath sounds: Normal breath  sounds.      Comments: On NC;   Abdominal:      Comments: Abdomen distended   Ventral hernia noted   Hypoactive bowel sound    Musculoskeletal:      Right lower leg: No edema.      Left lower leg: No edema.      Comments: Unable to follow commands    Skin:     General: Skin is warm and dry.   Neurological:      General: No focal deficit present.      Mental Status: She is disoriented.           Significant Labs: All pertinent labs within the past 24 hours have been reviewed.    Significant Imaging: I have reviewed all pertinent imaging results/findings within the past 24 hours.

## 2023-05-23 NOTE — ASSESSMENT & PLAN NOTE
Lab Results   Component Value Date    BUN 64 (H) 05/23/2023    CREATININE 1.8 (H) 05/23/2023     - strict I/O  - CMP qd, trend Cr  - gentle ivf and monitor resp status  - U/s retroperitoneal w/CKD, no obstruction  - renally dose all medications  - avoid nephrotoxic agents, NSAIDs, IV contrast, ACE/ARB  - Maintain MAP > 65

## 2023-05-23 NOTE — ASSESSMENT & PLAN NOTE
Patient has a current diagnosis of hypertensive urgency (without evidence of end organ damage) which is controlled.  Latest blood pressure and vitals reviewed-   Temp:  [96.5 °F (35.8 °C)-98.8 °F (37.1 °C)]   Pulse:  []   Resp:  [16-28]   BP: (119-178)/(63-96)   SpO2:  [92 %-100 %] .   Patient currently off IV antihypertensives.   Home meds for hypertension were reviewed and noted below.   Hypertension Medications             NIFEDIPINE (NIFEDICAL XL ORAL) Take by mouth.          Medication adjustment for hospital antihypertensives is as follows-  holding home med given sepsis    Will aim for controlled BP reduction by medications noted above. Monitor and mitigate end organ damage as indicated.  BP elevation likely triggered from constipation and associated pain. Improved after BM.

## 2023-05-23 NOTE — ASSESSMENT & PLAN NOTE
BCx on admit with GNR  - f/u speciation/sensitivity  - continue zosyn, deescalate as tolerated  - will discuss w/ID once have sufficient data to tailor abx

## 2023-05-23 NOTE — ASSESSMENT & PLAN NOTE
EKG w/Mobitz II. Pulse labile. May represent SSS. No intervention currently 2/2 overall condition/age.

## 2023-05-23 NOTE — PROGRESS NOTES
[facility] Medicine  Progress Note    Patient Name: Tatiana Sparrow  MRN: [id number]  Patient Class: IP- Inpatient   Admission Date: 5/20/2023  Length of Stay: 1 days  Attending Physician: Gerardo Vail MD  Primary Care Provider: Apple Beavers MD        Subjective:     Principal Problem:Severe sepsis        HPI:  89-year-old with past medical history of T2 DM, hypertension, thyroid disease, presenting for AMS, constipation, hyperglycemia. Over the phone with son, it was difficult to assess pt's baseline status and change in course. Per son, at baseline pt can recognize son and can make minimal conversation. However, over the last few days to weeks, pt has been declining mentally. Her BP and BG was high and was not having BM, and son brought pt in the ER.     At the ED, pt was altered, not alert and oriented; making incomprehensible sounds. Was hypertensive. CBC showed leukocytosis, anemia, and normal plts. CMP showed K 5.3,  EGFR 49.7, glucose 277. . Trop .090->.074. Admitted to hospital medicine for further management.       Overview/Hospital Course:  Admitted for management of altered mental status. UA with UTI.  Both urine and blood cultures growing GNR. Broadened to Zosyn. AXR shows impaction. SLP consulted, recommending NPO.  Cardiac function is markedly reduced on echo, and with renal and respiratory failure. Have discussed GOC w/family, and will focus on comfort care if patient's condition continues to worsen.      Interval History: NAEON; AXR w/impaction; mineral oil enema today, and if ineffective, disimpaction; continuing Zosyn; WBC continues to climb, waiting for GNR ID/sensitivity and will consult ID. ALEJANDRA worsening, w/potassium trending up; giving ivf, will monitor respiratory status closely. Patient is alert but does not communicate clearly. Echo w/significantly worsened EF and now eccentric hypertrophy. Discussed GOC with family, and will continue antibiotics for now, but  if her condition worsens, especially acutely, will pursue comfort-focused care.    Review of Systems   Unable to perform ROS: Mental status change   Objective:     Vital Signs (Most Recent):  Temp: 97.8 °F (36.6 °C) (05/23/23 1054)  Pulse: 101 (05/23/23 1054)  Resp: 18 (05/23/23 1054)  BP: (!) 146/76 (05/23/23 1054)  SpO2: 98 % (05/23/23 1054) Vital Signs (24h Range):  Temp:  [96.5 °F (35.8 °C)-98.8 °F (37.1 °C)] 97.8 °F (36.6 °C)  Pulse:  [] 101  Resp:  [16-28] 18  SpO2:  [92 %-100 %] 98 %  BP: (119-178)/(63-96) 146/76     Weight: 48.1 kg (106 lb)  Body mass index is 19.39 kg/m².    Intake/Output Summary (Last 24 hours) at 5/23/2023 1224  Last data filed at 5/22/2023 1523  Gross per 24 hour   Intake --   Output 250 ml   Net -250 ml           Physical Exam  Constitutional:       Comments: Altered making incomprehensible sound.   Thin and cachectic.    HENT:      Head: Normocephalic and atraumatic.      Nose: Nose normal.      Mouth/Throat:      Mouth: Mucous membranes are moist.      Pharynx: Oropharynx is clear.   Eyes:      Extraocular Movements: Extraocular movements intact.      Conjunctiva/sclera: Conjunctivae normal.      Pupils: Pupils are equal, round, and reactive to light.   Cardiovascular:      Rate and Rhythm: Normal rate and regular rhythm.      Pulses: Normal pulses.      Heart sounds: Normal heart sounds.      Comments: No JVD appreciated.   Pulmonary:      Effort: Pulmonary effort is normal.      Breath sounds: Normal breath sounds.      Comments: On NC;   Abdominal:      Comments: Abdomen distended   Ventral hernia noted   Hypoactive bowel sound    Musculoskeletal:      Right lower leg: No edema.      Left lower leg: No edema.      Comments: Unable to follow commands    Skin:     General: Skin is warm and dry.   Neurological:      General: No focal deficit present.      Mental Status: She is disoriented.           Significant Labs: All pertinent labs within the past 24 hours have been  reviewed.    Significant Imaging: I have reviewed all pertinent imaging results/findings within the past 24 hours.      Assessment/Plan:      * Severe sepsis  This patient does have evidence of infective focus  My overall impression is sepsis.  Source: Respiratory, Urinary Tract and Abdominal  Antibiotics given-   Antibiotics (72h ago, onward)    Start     Stop Route Frequency Ordered    05/22/23 2300  piperacillin-tazobactam (ZOSYN) 4.5 g in dextrose 5 % in water (D5W) 5 % 100 mL IVPB (MB+)         -- IV Every 12 hours (non-standard times) 05/22/23 1129        Latest lactate reviewed-  Recent Labs   Lab 05/22/23  0911   LACTATE 1.9     Organ dysfunction indicated by Acute kidney injury, Acute respiratory failure, Acute Decompensated Heart Failure, and Encephalopathy    Fluid challenge Not needed - patient is not hypotensive      Post- resuscitation assessment No - Post resuscitation assessment not needed       Will Not start Pressors  Source control achieved by: zosyn  - f/u bcx speciation/sensitivity    Fecal impaction  Seen on AXR 5/23AM. Disimpaction today.      Acute kidney injury superimposed on chronic kidney disease    Lab Results   Component Value Date    BUN 64 (H) 05/23/2023    CREATININE 1.8 (H) 05/23/2023     - strict I/O  - CMP qd, trend Cr  - discontinued ivf given cardiac function  - U/s retroperitoneal w/CKD, no obstruction  - renally dose all medications  - avoid nephrotoxic agents, NSAIDs, IV contrast, ACE/ARB  - Maintain MAP > 65        Nonrheumatic tricuspid valve regurgitation  -see HFrEF      Chronic HFrEF (heart failure with reduced ejection fraction)    Lab Results   Component Value Date    TROPONINI 0.074 (H) 05/20/2023     (H) 05/20/2023       Results for orders placed during the hospital encounter of 05/20/23    Echo    Interpretation Summary  · The left ventricle is normal in size with concentric remodeling and mildly decreased systolic function.  · The estimated ejection fraction  is 45%.  · There is abnormal septal wall motion.  · There are segmental left ventricular wall motion abnormalities.  · Left ventricular diastolic dysfunction.  · Moderate left atrial enlargement.  · Normal right ventricular size with low normal right ventricular systolic function.  · Mild right atrial enlargement.  · There is mild aortic valve stenosis and AR.  · Aortic valve area is 1.62 cm2; peak velocity is 1.85 m/s; mean gradient is 9 mmHg.  · Mild mitral regurgitation.  · Moderate to severe tricuspid regurgitation.    - compensated on exam  - if IVF required, judicious use given low EF  - Maintain on telemetry and daily EKGs  - no home gdmt     Gram-negative bacteremia  BCx on admit with GNR  - f/u speciation/sensitivity  - continue zosyn, deescalate as tolerated  - will discuss w/ID once have sufficient data to tailor abx      Acute cystitis with hematuria  - UA with UTI, on zosyn    Dementia without behavioral disturbance, psychotic disturbance, mood disturbance, or anxiety  Per son, Patient lives at home with him, is somewhat interactive with him, and able to feed herself at baseline    AV block, 2nd degree  EKG w/Mobitz II. Pulse labile. May represent SSS. No intervention currently 2/2 overall condition/age.      Acute hypoxemic respiratory failure  Patient with Hypoxic Respiratory failure which is Acute.  she is not on home oxygen. Supplemental oxygen was provided and noted-      .   Signs/symptoms of respiratory failure include- tachypnea. Contributing diagnoses includes - Pneumonia Labs and images were reviewed. Patient Has not had a recent ABG. Will treat underlying causes and adjust management of respiratory failure as follows- abx    Macrocytic anemia  No blood in the stool reported. Macrocytic anemia; Nutritional def part of ddx.  - trend    Elevated brain natriuretic peptide (BNP) level  Elevated bnp noted.  CXR pulmonary edema, interstitial type pneumonia or chronic interstitial lung changes.  However, overall pt looks cachectic and has not been eating and drinking well.     - given lasix 40 X 1.   - hold diuresis for now given ALEJANDRA    Adult failure to thrive  - Speech eval, NPO for now  - Monitor electrolytes       Goals of care, counseling/discussion  Advance Care Planning     Pt has been made DNR by the ED physician.     [facility]  I engaged the family in a conversation about advance care planning and we specifically addressed what the goals of care would be moving forward, in light of the patient's change in clinical status, specifically pt's mental status.  We did specifically address the patient's likely prognosis, which is poor.  We explored the patient's values and preferences for future care.  The family endorses that what is most important right now is to focus on symptom/pain control and and controlling hyperglycemia, constipation, and high blood pressure.     Accordingly, we have decided that the best plan to meet the patient's goals includes treatment targeting the above focus and reevaluate periodically.    I spent a total of 15 minutes engaging the patient in this advance care planning discussion.      Given worsening findings on echo, as well as worsening renal failure and hypoxic respiratory failure, again discussed goals of care with family today: this includes the patient's son and her daughter. Explained that the patient's prognosis is poor, with sepsis and multiorgan failure, in the setting of advanced age and debility. Stated that we think it to be best to continue antibiotics, but if her condition continues to worsen, or worsens acutely, will pursue comfort-focused care instead of invasive treatment. Her family states that they understand the nature of her prognosis, and are in agreement with this plan.      Hyperkalemia  - Monitor   - Given lasix X 1 on admit      Constipation  - CT with large stool burden  - AXR w/fecal impaction and colonic distension  ->disimpaction  today      Leukocytosis  - trending upwards  - continue abx as detailed elsewhere    Hypertensive urgency  Patient has a current diagnosis of hypertensive urgency (without evidence of end organ damage) which is controlled.  Latest blood pressure and vitals reviewed-   Temp:  [96.5 °F (35.8 °C)-98.8 °F (37.1 °C)]   Pulse:  []   Resp:  [16-28]   BP: (119-178)/(63-96)   SpO2:  [92 %-100 %] .   Patient currently off IV antihypertensives.   Home meds for hypertension were reviewed and noted below.   Hypertension Medications             NIFEDIPINE (NIFEDICAL XL ORAL) Take by mouth.          Medication adjustment for hospital antihypertensives is as follows-  holding home med given sepsis    Will aim for controlled BP reduction by medications noted above. Monitor and mitigate end organ damage as indicated.  BP elevation likely triggered from constipation and associated pain. Improved after BM.     Hyperglycemia  Noted to have hyperglycemia. However, pt is not eating well and she is elderly. I worry that starting long acting when pt is not eating can inadvertently drop glucose;     - Trend glucose q6 and place on low dose sliding scale.       Acute metabolic encephalopathy  89-year-old with past medical history of T2 DM, hypertension, thyroid disease, presenting for AMS, constipation, hyperglycemia. Over the phone with son, it was difficult to assess pt's baseline status. Per son, at baseline pt can recognize son and can make minimal conversation. However, over days to weeks, pt's mental status has declined; now, she is making incomprehensible sound. CT scan of the abdomen showed constipation. BP elevated. . Given pt is 89 years old, discussed with pt's son what should be the goal of treatment. Per son, if we can get bg on control, constipation and bp managed, then that will satisfy. Son did not seem wanted MRI or intensive evaluation for AMS. However, conversation was limited over phone.         DDX of AMS  includes: infectious, metabolic, toxin, vascular, drugs, constipation.   - likely due to UTI w/urosepsis  - now with GNR bacteremia, continue abx  - Monitor glucose (see hyperglycemia) and electrolytes.   - Monitor polypharmacy.      - See GOC, DNR      VTE Risk Mitigation (From admission, onward)         Ordered     heparin (porcine) injection 5,000 Units  Every 12 hours         05/22/23 1123     IP VTE HIGH RISK PATIENT  Once         05/21/23 0553     Place sequential compression device  Until discontinued         05/21/23 0553                Discharge Planning   NETO: 5/26/2023     Code Status: DNR   Is the patient medically ready for discharge?: No    Reason for patient still in hospital (select all that apply): Treatment  Discharge Plan A: Home, Home with family, Home Health                  Kamron Zheng DO  Department of Hospital Medicine   [facility]

## 2023-05-23 NOTE — PLAN OF CARE
Problem: Adult Inpatient Plan of Care  Goal: Plan of Care Review  Outcome: Ongoing, Progressing  Goal: Patient-Specific Goal (Individualized)  Outcome: Ongoing, Progressing  Goal: Absence of Hospital-Acquired Illness or Injury  Outcome: Ongoing, Progressing  Goal: Optimal Comfort and Wellbeing  Outcome: Ongoing, Progressing  Goal: Readiness for Transition of Care  Outcome: Ongoing, Progressing     Problem: Fall Injury Risk  Goal: Absence of Fall and Fall-Related Injury  Outcome: Ongoing, Progressing     Problem: Adjustment to Illness (Sepsis/Septic Shock)  Goal: Optimal Coping  Outcome: Ongoing, Progressing     Problem: Bleeding (Sepsis/Septic Shock)  Goal: Absence of Bleeding  Outcome: Ongoing, Progressing     Problem: Glycemic Control Impaired (Sepsis/Septic Shock)  Goal: Blood Glucose Level Within Desired Range  Outcome: Ongoing, Progressing     Problem: Nutrition Impaired (Sepsis/Septic Shock)  Goal: Optimal Nutrition Intake  Outcome: Ongoing, Progressing     Problem: Fluid and Electrolyte Imbalance (Acute Kidney Injury/Impairment)  Goal: Fluid and Electrolyte Balance  Outcome: Ongoing, Progressing   Patient remain alert.  Digitally disimpacted bowels.  Quinones catheter placed, sterile precautions maintained.  Patient tolerated well.  Tele monitor placed, per orders.  No acute health changes during shift.

## 2023-05-23 NOTE — ASSESSMENT & PLAN NOTE
Advance Care Planning     Pt has been made DNR by the ED physician.     [facility]  I engaged the family in a conversation about advance care planning and we specifically addressed what the goals of care would be moving forward, in light of the patient's change in clinical status, specifically pt's mental status.  We did specifically address the patient's likely prognosis, which is poor.  We explored the patient's values and preferences for future care.  The family endorses that what is most important right now is to focus on symptom/pain control and and controlling hyperglycemia, constipation, and high blood pressure.     Accordingly, we have decided that the best plan to meet the patient's goals includes treatment targeting the above focus and reevaluate periodically.    I spent a total of 15 minutes engaging the patient in this advance care planning discussion.      Given worsening findings on echo, as well as worsening renal failure and hypoxic respiratory failure, again discussed goals of care with family today: this includes the patient's son and her daughter. Explained that the patient's prognosis is poor, with sepsis and multiorgan failure, in the setting of advanced age and debility. Stated that we think it to be best to continue antibiotics, but if her condition continues to worsen, or worsens acutely, will pursue comfort-focused care instead of invasive treatment. Her family states that they understand the nature of her prognosis, and are in agreement with this plan.

## 2023-05-23 NOTE — PLAN OF CARE
SW attempted to complete assessment.  Pt non-verbal with altered mental status.  SW telephoned pt's son, Jc [phone]; left a message requesting a return call.      Honey Buchanan LMSW  PRN-  [facility]  Ext. [phone]

## 2023-05-23 NOTE — PLAN OF CARE
[facility]  Initial Discharge Assessment       Primary Care Provider: Apple Beavers MD    Admission Diagnosis: Bradycardia [R00.1]  Chest pain [R07.9]  AMS (altered mental status) [R41.82]    Admission Date: 5/20/2023  Expected Discharge Date: 5/26/2023    Transition of Care Barriers: None    Payor: [employer]ED [employer]ICARE / Plan: [employer]URE HE[employer] / Product Type: Medicare Advantage /     Extended Emergency Contact Information  Primary Emergency Contact: Jc Sparrow  Address: [address] [place] [address] [place] [address] [facility]  Home Phone: [phone]  Relation: Son  Secondary Emergency Contact: Jc Sparrow   Mobile Phone: [phone]  Relation: Grandchild    Discharge Plan A: Home, Home with family, Home Health  Discharge Plan B: Home, Home with family    No Pharmacies Listed    Initial Assessment (most recent)       Adult Discharge Assessment - 05/23/23 1259          Discharge Assessment    Assessment Type Discharge Planning Assessment     Confirmed/corrected address, phone number and insurance Yes     Confirmed Demographics Correct on Facesheet     Source of Information family;health record     When was your last doctors appointment? --   Pt's son reported last appointment with PCP was April 2023.    Communicated NETO with patient/caregiver Yes     Reason For Admission severe sepsis     People in Home child(wayne), adult;grandchild(wayne);other relative(s)     Do you expect to return to your current living situation? Yes     Do you have help at home or someone to help you manage your care at home? Yes     Who are your caregiver(s) and their phone number(s)? Jc [phone]     Prior to hospitilization cognitive status: Unable to Assess     Current cognitive status: Unable to Assess     Walking or Climbing Stairs ambulation difficulty, requires equipment;transferring difficulty, dependent;stair climbing difficulty, dependent;ambulation difficulty,  dependent     Mobility Management wheelchair     Dressing/Bathing bathing difficulty, dependent;dressing difficulty, dependent     Dressing/Bathing Management Pt's son, wife, and daughter helps with bathing, dressing, etc.     Home Accessibility wheelchair accessible     Equipment Currently Used at Home wheelchair     Readmission within 30 days? No     Patient currently being followed by outpatient case management? No     Do you currently have service(s) that help you manage your care at home? No     Do you take prescription medications? No     Do you have prescription coverage? Yes     Coverage [employer]     Do you have any problems affording any of your prescribed medications? No     Is the patient taking medications as prescribed? yes     Who is going to help you get home at discharge? Pt's family can provide transportation home.     How do you get to doctors appointments? family or friend will provide     Are you on dialysis? No     Do you take coumadin? No     Discharge Plan A Home;Home with family;Home Health     Discharge Plan B Home;Home with family     DME Needed Upon Discharge  other (see comments)   TBD    Discharge Plan discussed with: Adult children;Caregiver     Transition of Care Barriers None        Physical Activity    On average, how many days per week do you engage in moderate to strenuous exercise (like a brisk walk)? 0 days     On average, how many minutes do you engage in exercise at this level? 0 min        Financial Resource Strain    How hard is it for you to pay for the very basics like food, housing, medical care, and heating? Not hard at all        Housing Stability    In the last 12 months, was there a time when you were not able to pay the mortgage or rent on time? No     In the last 12 months, was there a time when you did not have a steady place to sleep or slept in a shelter (including now)? No        Transportation Needs    In the past 12 months, has lack of  transportation kept you from medical appointments or from getting medications? No     In the past 12 months, has lack of transportation kept you from meetings, work, or from getting things needed for daily living? No        Food Insecurity    Within the past 12 months, you worried that your food would run out before you got the money to buy more. Never true     Within the past 12 months, the food you bought just didn't last and you didn't have money to get more. Never true        Stress    Do you feel stress - tense, restless, nervous, or anxious, or unable to sleep at night because your mind is troubled all the time - these days? Not at all        Social Connections    In a typical week, how many times do you talk on the phone with family, friends, or neighbors? More than three times a week     How often do you get together with friends or relatives? More than three times a week     How often do you attend Sabianist or Christian services? More than 4 times per year        Alcohol Use    Q1: How often do you have a drink containing alcohol? Never     Q2: How many drinks containing alcohol do you have on a typical day when you are drinking? Patient does not drink     Q3: How often do you have six or more drinks on one occasion? Never        OTHER    Name(s) of People in Home Son, wife, and granddaughter.                   SW completed assessment with pt's son (Jc).  No hospital readmissions within the last 30 days.  Pt's family is able to provide transportation home.      Pt lives with her son and his family.  Pt dependent with ADLs and mobility. Pt's family bath, bath, cook, and clean for pt.  Pt's daughter in law and grand daughter has medical backgrounds and able to provide help at home.  Pt is wheelchair dependent.  Pt has wheelchair at home.      Pt does not receive coumadin, dialysis, or HH services at this time.      Disp:  Home w/ HH. Care at Home.  May need home O2.     Honey Buchanan LMSW  PRN-Social  Worker  [facility]  Ext. [phone]

## 2023-05-23 NOTE — ASSESSMENT & PLAN NOTE
- CT with large stool burden  - AXR w/fecal impaction and colonic distension  ->mineral oil enema today, if ineffective, disimpaction

## 2023-05-23 NOTE — ASSESSMENT & PLAN NOTE
89-year-old with past medical history of T2 DM, hypertension, thyroid disease, presenting for AMS, constipation, hyperglycemia. Over the phone with son, it was difficult to assess pt's baseline status. Per son, at baseline pt can recognize son and can make minimal conversation. However, over days to weeks, pt's mental status has declined; now, she is making incomprehensible sound. CT scan of the abdomen showed constipation. BP elevated. . Given pt is 89 years old, discussed with pt's son what should be the goal of treatment. Per son, if we can get bg on control, constipation and bp managed, then that will satisfy. Son did not seem wanted MRI or intensive evaluation for AMS. However, conversation was limited over phone.         DDX of AMS includes: infectious, metabolic, toxin, vascular, drugs, constipation.   - likely due to UTI w/urosepsis  - now with GNR bacteremia, continue abx  - Monitor glucose (see hyperglycemia) and electrolytes.   - Monitor polypharmacy.      - See GOC, DNR

## 2023-05-23 NOTE — ASSESSMENT & PLAN NOTE
This patient does have evidence of infective focus  My overall impression is sepsis.  Source: Respiratory, Urinary Tract and Abdominal  Antibiotics given-   Antibiotics (72h ago, onward)    Start     Stop Route Frequency Ordered    05/22/23 2300  piperacillin-tazobactam (ZOSYN) 4.5 g in dextrose 5 % in water (D5W) 5 % 100 mL IVPB (MB+)         -- IV Every 12 hours (non-standard times) 05/22/23 1129        Latest lactate reviewed-  Recent Labs   Lab 05/22/23  0911   LACTATE 1.9     Organ dysfunction indicated by Acute kidney injury, Acute respiratory failure and Encephalopathy    Fluid challenge Not needed - patient is not hypotensive      Post- resuscitation assessment No - Post resuscitation assessment not needed       Will Not start Pressors  Source control achieved by: zosyn  - f/u bcx speciation/sensitivity

## 2023-05-24 LAB
BACTERIA BLD CULT: ABNORMAL

## 2023-05-24 NOTE — NURSING
Entered patient room @1855. Did not see chest rise, upon assessment noted no radial pulse and absent of breath sounds.  Notified on call MD. Charge nurse made aware.

## 2023-05-24 NOTE — SIGNIFICANT EVENT
Death Note    Called to bedside by patient's nurse. Nursing supervisor notified. Family at bedside.  has been called and is also at bedside.    Patient is not responding to verbal or tactile stimuli. Patient does not have a papillary or corneal reflex. Pupils are fixed and dilated. No heart or breath sounds on auscultation. No respirations. No palpable pulses.     Time of death:  05/23/2023 7:24 PM     Cause of Death:  Acute Hypoxemic Respiratory Failure    Email: [email or web address]

## 2023-05-24 NOTE — PLAN OF CARE
[facility]  Discharge Final Note    Primary Care Provider: Apple Beavers MD    Expected Discharge Date: 2023    Pt .      Final Discharge Note (most recent)       Final Note - 23 0753          Final Note    Assessment Type Final Discharge Note     Anticipated Discharge Disposition         Post-Acute Status    Post-Acute Authorization Other     Other Status See Comments   Pt .    Discharge Delays None known at this time                     Important Message from Medicare  Important Message from Medicare regarding Discharge Appeal Rights: Given to patient/caregiver, Explained to patient/caregiver, Signed/date by patient/caregiver     Date IMM was signed: 23  Time IMM was signed: 912      Honey Buchanan LMSW  PRN-  [facility]  Ext. [phone]

## 2023-05-24 NOTE — DISCHARGE SUMMARY
[facility] Medicine  Discharge Summary      Patient Name: Tatiana Sparrow  MRN: [id number]  YADIEL: [id number]  Patient Class: IP- Inpatient  Admission Date: 5/20/2023  Hospital Length of Stay: 1 days  Discharge Date and Time:  05/24/2023 10:49 AM  Attending Physician: Elayne att. providers found   Discharging Provider: Kamron Zheng DO  Primary Care Provider: Apple Beavers MD  [facility] Medicine Team: [place] [address] Kamron Zheng DO  Primary Care Team: [facility] 5    HPI:   89-year-old with past medical history of T2 DM, hypertension, thyroid disease, presenting for AMS, constipation, hyperglycemia. Over the phone with son, it was difficult to assess pt's baseline status and change in course. Per son, at baseline pt can recognize son and can make minimal conversation. However, over the last few days to weeks, pt has been declining mentally. Her BP and BG was high and was not having BM, and son brought pt in the ER.     At the ED, pt was altered, not alert and oriented; making incomprehensible sounds. Was hypertensive. CBC showed leukocytosis, anemia, and normal plts. CMP showed K 5.3,  EGFR 49.7, glucose 277. . Trop .090->.074. Admitted to hospital medicine for further management.       Hospital Course:   Admitted for management of encephalopathy. UA with UTI.  Both urine and blood cultures growing GNR. Broadened to Zosyn. AXR shows impaction. SLP consulted, recommending NPO. Cardiac function is markedly reduced on 5/21 echo, and patient has developed renal and respiratory failure. Have discussed GOC w/family, and will focus on comfort care if patient's condition continues to worsen. Unfortunately, leukocytosis continues to trend upwards. Patient w/large stool burden and impaction seen on CT abdomen/pelvis and abdominal xray. Disimpacted on 5/23. Repeat echo on 5/23 demonstrated precipitous drop in ejection fraction to 15%, as well as now eccentric hypertrophy: troponin markedly elevated.  Suspected NSTEMI in setting of sepsis and started ACS protocol following discussions with family. Patient exhibited progressive renal failure w/anion gap metabolic acidosis and hyperkalemia resistant to shifting. Discussed with family that prognosis is poor, and all in agreement that given her age and overall condition, any invasive treatment may do more harm than good. Patient  on 523PM.       Goals of Care Treatment Preferences:  Code Status: DNR          What is most important right now is to focus on symptom/pain control, and controlling hyperglycemia, constipation, and high blood pressure. .  Accordingly, we have decided that the best plan to meet the patient's goals includes treatment targeting the above focus and reevaluate periodically..      Consults:     No new Assessment & Plan notes have been filed under this hospital service since the last note was generated.  Service: Hospital Medicine    Final Active Diagnoses:    Diagnosis Date Noted POA    PRINCIPAL PROBLEM:  Severe sepsis [A41.9, R65.20] 2023 Yes    Fecal impaction [K56.41] 2023 Unknown    Acute cystitis with hematuria [N30.01] 2023 Yes    Gram-negative bacteremia [R78.81] 2023 Yes    Chronic HFrEF (heart failure with reduced ejection fraction) [I50.22] 2023 No    Nonrheumatic tricuspid valve regurgitation [I36.1] 2023 Yes    Acute kidney injury superimposed on chronic kidney disease [N17.9, N18.9] 2023 No    Acute metabolic encephalopathy [G93.41] 2023 Yes    Hyperglycemia [R73.9] 2023 Yes    Hypertensive urgency [I16.0] 2023 Yes    Leukocytosis [D72.829] 2023 Yes    Constipation [K59.00] 2023 Yes    Hyperkalemia [E87.5] 2023 Yes    Goals of care, counseling/discussion [Z71.89] 2023 Not Applicable    Adult failure to thrive [R62.7] 2023 Yes    Elevated brain natriuretic peptide (BNP) level [R79.89] 2023 Yes    Macrocytic anemia  [D53.9] 2023 Yes    Acute hypoxemic respiratory failure [J96.01] 2023 Yes    AV block, 2nd degree [I44.1] 2023 Yes    Dementia without behavioral disturbance, psychotic disturbance, mood disturbance, or anxiety [F03.90] 2023 Yes      Problems Resolved During this Admission:       Discharged Condition:     Disposition:     Follow Up:    Patient Instructions:   No discharge procedures on file.    Significant Diagnostic Studies: Labs:   CMP   Recent Labs   Lab 23  0322 23  1539 23  1723    141 140   K 5.2* 5.8* 6.5*    107 107   CO2 19* 13* 12*   * 132* 119*   BUN 64* 80* 80*   CREATININE 1.8* 2.2* 2.2*   CALCIUM 9.1 8.6* 8.4*   ALBUMIN  --  2.4* 2.4*   ANIONGAP 13 21* 21*    and CBC   Recent Labs   Lab 23  0322 23  1723   WBC 28.94* 19.42*   HGB 11.0* 11.8*   HCT 34.9* 38.4   * 118*       Pending Diagnostic Studies:     Procedure Component Value Units Date/Time    Rhythm strip [[id number]]     Order Status: Sent Lab Status: No result     Vitamin B1 [[id number]] Collected: 23    Order Status: Sent Lab Status: In process Updated: 23 06    Specimen: Blood          Medications:  None    Indwelling Lines/Drains at time of discharge:   Lines/Drains/Airways     Drain  Duration                Urethral Catheter 23 1500 <1 day                Time spent on the discharge of patient: 30 minutes.         Kamron Zheng DO  Department of Hospital Medicine  [facility] Surg

## 2023-05-25 LAB — VIT B1 BLD-MCNC: 32 UG/L (ref 38–122)

## 2023-05-27 LAB
BACTERIA BLD CULT: NORMAL
BACTERIA BLD CULT: NORMAL